# Patient Record
Sex: FEMALE | Race: WHITE | NOT HISPANIC OR LATINO | Employment: OTHER | URBAN - METROPOLITAN AREA
[De-identification: names, ages, dates, MRNs, and addresses within clinical notes are randomized per-mention and may not be internally consistent; named-entity substitution may affect disease eponyms.]

---

## 2017-01-12 RX ORDER — LEVOTHYROXINE SODIUM 0.05 MG/1
TABLET ORAL
Qty: 30 TABLET | Refills: 6 | Status: SHIPPED | OUTPATIENT
Start: 2017-01-12 | End: 2017-06-29 | Stop reason: SDUPTHER

## 2017-01-12 RX ORDER — CLONIDINE HYDROCHLORIDE 0.1 MG/1
0.1 TABLET ORAL NIGHTLY PRN
Qty: 30 TABLET | Refills: 1 | Status: SHIPPED | OUTPATIENT
Start: 2017-01-12 | End: 2018-08-07

## 2017-01-18 ENCOUNTER — TELEPHONE (OUTPATIENT)
Dept: INTERNAL MEDICINE | Facility: CLINIC | Age: 57
End: 2017-01-18

## 2017-01-18 NOTE — TELEPHONE ENCOUNTER
----- Message from Gen Sultana MD sent at 1/18/2017 10:03 AM EST -----  Labs are really good. No change for now, talk at next visit

## 2017-03-17 RX ORDER — AMLODIPINE BESYLATE 10 MG/1
TABLET ORAL
Qty: 90 TABLET | Refills: 0 | Status: SHIPPED | OUTPATIENT
Start: 2017-03-17 | End: 2017-06-19 | Stop reason: SDUPTHER

## 2017-03-17 RX ORDER — BENAZEPRIL HYDROCHLORIDE 20 MG/1
TABLET ORAL
Qty: 90 TABLET | Refills: 0 | Status: SHIPPED | OUTPATIENT
Start: 2017-03-17 | End: 2017-06-19 | Stop reason: SDUPTHER

## 2017-06-19 RX ORDER — BENAZEPRIL HYDROCHLORIDE 20 MG/1
TABLET ORAL
Qty: 90 TABLET | Refills: 0 | Status: SHIPPED | OUTPATIENT
Start: 2017-06-19 | End: 2017-09-20 | Stop reason: SDUPTHER

## 2017-06-19 RX ORDER — AMLODIPINE BESYLATE 10 MG/1
TABLET ORAL
Qty: 90 TABLET | Refills: 0 | Status: SHIPPED | OUTPATIENT
Start: 2017-06-19 | End: 2017-09-20 | Stop reason: SDUPTHER

## 2017-06-29 ENCOUNTER — OFFICE VISIT (OUTPATIENT)
Dept: INTERNAL MEDICINE | Facility: CLINIC | Age: 57
End: 2017-06-29

## 2017-06-29 VITALS
OXYGEN SATURATION: 98 % | WEIGHT: 167.2 LBS | HEART RATE: 88 BPM | BODY MASS INDEX: 29.62 KG/M2 | SYSTOLIC BLOOD PRESSURE: 132 MMHG | DIASTOLIC BLOOD PRESSURE: 78 MMHG | HEIGHT: 63 IN | RESPIRATION RATE: 16 BRPM

## 2017-06-29 DIAGNOSIS — E07.9 DISEASE OF THYROID GLAND: ICD-10-CM

## 2017-06-29 DIAGNOSIS — I10 BENIGN ESSENTIAL HTN: Primary | ICD-10-CM

## 2017-06-29 DIAGNOSIS — E78.2 MIXED HYPERLIPIDEMIA: ICD-10-CM

## 2017-06-29 LAB
T4 FREE SERPL-MCNC: 1.03 NG/DL (ref 0.93–1.7)
TSH SERPL DL<=0.005 MIU/L-ACNC: 3.35 MIU/ML (ref 0.27–4.2)

## 2017-06-29 PROCEDURE — 99214 OFFICE O/P EST MOD 30 MIN: CPT | Performed by: INTERNAL MEDICINE

## 2017-06-29 RX ORDER — LEVOTHYROXINE SODIUM 0.05 MG/1
50 TABLET ORAL DAILY
Qty: 90 TABLET | Refills: 3 | Status: SHIPPED | OUTPATIENT
Start: 2017-06-29 | End: 2018-07-12 | Stop reason: SDUPTHER

## 2017-06-29 NOTE — PROGRESS NOTES
"Subjective   Becca Reilly is a 56 y.o. female.     History of Present Illness   55 yo female with HTN, hypothyroidism, HL.  She is reporting some afternoon tiredness.  Usually around 3 she gets really very tired. Last few weeks her hot flashes have picked up. [place]   The following portions of the patient's history were reviewed and updated as appropriate: allergies, current medications, past family history, past medical history, past social history, past surgical history and problem list.    Review of Systems   Constitutional: Negative.    HENT: Negative.    Respiratory: Negative.    Genitourinary: Negative.    Musculoskeletal: Negative.    Hematological: Negative.    Psychiatric/Behavioral: Negative.    All other systems reviewed and are negative.      Objective   Physical Exam   /78  Pulse 88  Resp 16  Ht 63\" (160 cm)  Wt 167 lb 3.2 oz (75.8 kg)  SpO2 98%  BMI 29.62 kg/m2    General Appearance:  Alert, cooperative, no distress, appears stated age   Head:  Normocephalic, without obvious abnormality, atraumatic   Eyes:  PERRL, conjunctiva/corneas clear, EOM's intact   Ears:  Normal TM's and external ear canals, both ears   Nose: Nares normal, septum midline,mucosa normal, no drainage or sinus tenderness   Throat: Lips, mucosa, and tongue normal; teeth and gums normal   Neck: Supple, symmetrical, trachea midline, no adenopathy;  thyroid: not enlarged, symmetric, no tenderness/mass/nodules; no carotid bruit or JVD   Back:   Symmetric, no curvature, ROM normal, no CVA tenderness   Lungs:   Clear to auscultation bilaterally, respirations unlabored   Breasts:  defer   Heart:  Regular rate and rhythm, S1 and S2 normal, no murmur, rub, or gallop   Abdomen:   Soft, non-tender, bowel sounds active all four quadrants,  no masses, no organomegaly   Pelvic: Deferred   Extremities: Extremities normal, atraumatic, no cyanosis or edema   Pulses: 2+ and symmetric "   Skin: Skin color, texture, turgor normal, no rashes or lesions   Lymph nodes: Cervical, supraclavicular, and axillary nodes normal   Neurologic: Normal       12/2016 - TSH and lipid reviewed, High total 91.    Assessment/Plan   Becca was seen today for hot flashes and hypothyroidism.    Diagnoses and all orders for this visit:    Benign essential HTN    Mixed hyperlipidemia    Disease of thyroid gland  -     TSH  -     T4, free    refill thyroid meds with 90 day supply    HTN- well controlled  Check BP when tired, record  Goal around 120/70  Start exercising daily to get your stamina up  Goal 45 min per day    Hot flashing - strongly recommend spin class  Continue clonidine    Continue aspirin.     HEIDI risk calculation done 6.5%.     Discussed.  Spent 20 min in care of patient.

## 2017-06-30 ENCOUNTER — TELEPHONE (OUTPATIENT)
Dept: INTERNAL MEDICINE | Facility: CLINIC | Age: 57
End: 2017-06-30

## 2017-06-30 NOTE — TELEPHONE ENCOUNTER
----- Message from Gen Sultana MD sent at 6/29/2017  4:57 PM EDT -----  Labs good, sent 90 day refill for her on thyroid as we discussed

## 2017-09-20 RX ORDER — AMLODIPINE BESYLATE 10 MG/1
TABLET ORAL
Qty: 90 TABLET | Refills: 0 | Status: SHIPPED | OUTPATIENT
Start: 2017-09-20 | End: 2017-12-30 | Stop reason: SDUPTHER

## 2017-09-20 RX ORDER — BENAZEPRIL HYDROCHLORIDE 20 MG/1
TABLET ORAL
Qty: 90 TABLET | Refills: 0 | Status: SHIPPED | OUTPATIENT
Start: 2017-09-20 | End: 2017-12-30 | Stop reason: SDUPTHER

## 2017-11-14 ENCOUNTER — TELEPHONE (OUTPATIENT)
Dept: INTERNAL MEDICINE | Facility: CLINIC | Age: 57
End: 2017-11-14

## 2017-11-14 NOTE — TELEPHONE ENCOUNTER
----- Message from Bibi Uriarte sent at 11/14/2017  2:28 PM EST -----  Regarding: FW: PHYSICAL APPT & MEDS  Patient was contacted and decision was made based on information below that we would cancel lab and physical appointment and watch for any cancellations before end of calendar year.  ----- Message -----     From: Yuly Taylor MA     Sent: 11/14/2017   1:04 PM       To: Bibi Uriarte  Subject: RE: PHYSICAL APPT & MEDS                         We will refill her meds no problem.  ----- Message -----     From: Bibi Uriarte     Sent: 11/14/2017  12:50 PM       To: Yuly Taylor MA  Subject: PHYSICAL APPT & MEDS                             ivan    [phone] - pt #    Patient originally scheduled for labs 12/26 and physical 12/29.  Physical rescheduled to 2018 due to provider being out.  Patient's insurance ([place]) being cancelled and not rewritten for residents in [facility].  She does need her meds though.    Would Dr. Sultana want to write scripts for patient or have patient come in for a regular med check appt until insurance is figured out.

## 2017-12-11 DIAGNOSIS — E78.2 MIXED HYPERLIPIDEMIA: ICD-10-CM

## 2017-12-11 DIAGNOSIS — I10 BENIGN ESSENTIAL HTN: Primary | ICD-10-CM

## 2017-12-11 DIAGNOSIS — E07.9 DISEASE OF THYROID GLAND: ICD-10-CM

## 2017-12-11 DIAGNOSIS — M18.11 OSTEOARTHRITIS OF CARPOMETACARPAL JOINT OF RIGHT THUMB, UNSPECIFIED OSTEOARTHRITIS TYPE: ICD-10-CM

## 2017-12-12 ENCOUNTER — LAB (OUTPATIENT)
Dept: INTERNAL MEDICINE | Facility: CLINIC | Age: 57
End: 2017-12-12

## 2017-12-12 DIAGNOSIS — E78.2 MIXED HYPERLIPIDEMIA: ICD-10-CM

## 2017-12-12 DIAGNOSIS — M18.11 OSTEOARTHRITIS OF CARPOMETACARPAL JOINT OF RIGHT THUMB, UNSPECIFIED OSTEOARTHRITIS TYPE: ICD-10-CM

## 2017-12-12 DIAGNOSIS — I10 BENIGN ESSENTIAL HTN: ICD-10-CM

## 2017-12-12 DIAGNOSIS — E07.9 DISEASE OF THYROID GLAND: ICD-10-CM

## 2017-12-12 LAB
25(OH)D3+25(OH)D2 SERPL-MCNC: 53.3 NG/ML
ALBUMIN SERPL-MCNC: 4.4 G/DL (ref 3.5–5.2)
ALBUMIN/GLOB SERPL: 1.7 G/DL
ALP SERPL-CCNC: 71 U/L (ref 40–129)
ALT SERPL-CCNC: 16 U/L (ref 5–33)
AST SERPL-CCNC: 19 U/L (ref 5–32)
BASOPHILS # BLD AUTO: 0.03 10*3/MM3 (ref 0–0.2)
BASOPHILS NFR BLD AUTO: 0.3 % (ref 0–2)
BILIRUB SERPL-MCNC: 0.4 MG/DL (ref 0.2–1.2)
BUN SERPL-MCNC: 17 MG/DL (ref 6–20)
BUN/CREAT SERPL: 25 (ref 7–25)
CALCIUM SERPL-MCNC: 9.6 MG/DL (ref 8.6–10.5)
CHLORIDE SERPL-SCNC: 96 MMOL/L (ref 98–107)
CHOLEST SERPL-MCNC: 267 MG/DL (ref 0–200)
CO2 SERPL-SCNC: 28.8 MMOL/L (ref 22–29)
CREAT SERPL-MCNC: 0.68 MG/DL (ref 0.57–1)
EOSINOPHIL # BLD AUTO: 0.21 10*3/MM3 (ref 0.1–0.3)
EOSINOPHIL NFR BLD AUTO: 2 % (ref 0–4)
ERYTHROCYTE [DISTWIDTH] IN BLOOD BY AUTOMATED COUNT: 13.6 % (ref 11.5–14.5)
GFR SERPLBLD CREATININE-BSD FMLA CKD-EPI: 108 ML/MIN/1.73
GFR SERPLBLD CREATININE-BSD FMLA CKD-EPI: 89 ML/MIN/1.73
GLOBULIN SER CALC-MCNC: 2.6 GM/DL
GLUCOSE SERPL-MCNC: 90 MG/DL (ref 65–99)
HBA1C MFR BLD: 5.7 % (ref 4.8–5.6)
HCT VFR BLD AUTO: 41.9 % (ref 37–47)
HDLC SERPL-MCNC: 105 MG/DL (ref 40–60)
HGB BLD-MCNC: 13.9 G/DL (ref 12–16)
IMM GRANULOCYTES # BLD: 0.03 10*3/MM3 (ref 0–0.03)
IMM GRANULOCYTES NFR BLD: 0.3 % (ref 0–0.5)
LDLC SERPL CALC-MCNC: 143 MG/DL (ref 0–100)
LDLC/HDLC SERPL: 1.36 {RATIO}
LYMPHOCYTES # BLD AUTO: 1.19 10*3/MM3 (ref 0.6–4.8)
LYMPHOCYTES NFR BLD AUTO: 11.5 % (ref 20–45)
MCH RBC QN AUTO: 30.4 PG (ref 27–31)
MCHC RBC AUTO-ENTMCNC: 33.2 G/DL (ref 31–37)
MCV RBC AUTO: 91.7 FL (ref 81–99)
MONOCYTES # BLD AUTO: 0.47 10*3/MM3 (ref 0–1)
MONOCYTES NFR BLD AUTO: 4.5 % (ref 3–8)
NEUTROPHILS # BLD AUTO: 8.46 10*3/MM3 (ref 1.5–8.3)
NEUTROPHILS NFR BLD AUTO: 81.4 % (ref 45–70)
NRBC BLD AUTO-RTO: 0 /100 WBC (ref 0–0)
PLATELET # BLD AUTO: 357 10*3/MM3 (ref 140–500)
POTASSIUM SERPL-SCNC: 4.8 MMOL/L (ref 3.5–5.2)
PROT SERPL-MCNC: 7 G/DL (ref 6–8.5)
RBC # BLD AUTO: 4.57 10*6/MM3 (ref 4.2–5.4)
SODIUM SERPL-SCNC: 138 MMOL/L (ref 136–145)
TRIGL SERPL-MCNC: 94 MG/DL (ref 0–150)
TSH SERPL DL<=0.005 MIU/L-ACNC: 2.55 MIU/ML (ref 0.27–4.2)
VLDLC SERPL CALC-MCNC: 18.8 MG/DL (ref 7–27)
WBC # BLD AUTO: 10.39 10*3/MM3 (ref 4.8–10.8)

## 2017-12-19 ENCOUNTER — OFFICE VISIT (OUTPATIENT)
Dept: INTERNAL MEDICINE | Facility: CLINIC | Age: 57
End: 2017-12-19

## 2017-12-19 VITALS
DIASTOLIC BLOOD PRESSURE: 82 MMHG | HEIGHT: 63 IN | SYSTOLIC BLOOD PRESSURE: 122 MMHG | WEIGHT: 172 LBS | RESPIRATION RATE: 16 BRPM | OXYGEN SATURATION: 98 % | HEART RATE: 86 BPM | BODY MASS INDEX: 30.48 KG/M2

## 2017-12-19 DIAGNOSIS — I10 BENIGN ESSENTIAL HTN: Primary | ICD-10-CM

## 2017-12-19 DIAGNOSIS — R73.9 HYPERGLYCEMIA: ICD-10-CM

## 2017-12-19 DIAGNOSIS — E07.9 DISEASE OF THYROID GLAND: ICD-10-CM

## 2017-12-19 PROCEDURE — 99214 OFFICE O/P EST MOD 30 MIN: CPT | Performed by: INTERNAL MEDICINE

## 2017-12-19 NOTE — PATIENT INSTRUCTIONS
nRBC 0.0 0.0 - 0.0 /100 WBC         Assessment/Plan   Becca was seen today for hypertension.     Diagnoses and all orders for this visit:     Benign essential HTN     Disease of thyroid gland     Hyperglycemia     Continue daily exercises  Start PT through [email or web address] for knee and quad strengthening     Hyperglycemia - prediabetes  Lower bread, rice and noodle intake  Avoid sweets.      No Follow-up on file.     Gen Sultana MD  12/19/2017

## 2017-12-19 NOTE — PROGRESS NOTES
Subjective     Becca Reilly is a 57 y.o. female, who presents with a chief complaint of   Chief Complaint   Patient presents with   • Hypertension       HPI   56 yo female with PMHx Hypothyroidism and HTN.  BP stable here and at home. NO cp. Her thyroid is stable here for lab review.     Also here for a new pain -   Pain in R leg after twisting her R leg,  During a pickle ball game.  She does have pain in the morning first thing when walking, but is tolerating playing pickle ball. She has one toe in her R foot - really intertriginal which is tingling.  Has appt with Trey Koenig later in the week. She is having trouble in the knee itself with stairs.        The following portions of the patient's history were reviewed and updated as appropriate: allergies, current medications, past family history, past medical history, past social history, past surgical history and problem list.    Allergies: Review of patient's allergies indicates no known allergies.    Current Outpatient Prescriptions:   •  amLODIPine (NORVASC) 10 MG tablet, TAKE ONE TABLET BY MOUTH ONE TIME DAILY , Disp: 90 tablet, Rfl: 0  •  aspirin 81 MG tablet, Take  by mouth., Disp: , Rfl:   •  benazepril (LOTENSIN) 20 MG tablet, TAKE ONE TABLET BY MOUTH ONE TIME DAILY , Disp: 90 tablet, Rfl: 0  •  CloNIDine (CATAPRES) 0.1 MG tablet, Take 1 tablet by mouth At Night As Needed for high blood pressure (hot flashes)., Disp: 30 tablet, Rfl: 1  •  levothyroxine (SYNTHROID, LEVOTHROID) 50 MCG tablet, Take 1 tablet by mouth Daily., Disp: 90 tablet, Rfl: 3  There are no discontinued medications.    Review of Systems   Constitutional: Negative.  Negative for fatigue, fever and unexpected weight change.   HENT: Negative for sinus pressure and trouble swallowing.    Respiratory: Negative.  Negative for cough.    Cardiovascular: Negative for chest pain, palpitations and leg swelling.   Gastrointestinal: Negative for constipation and diarrhea.   Endocrine:        Stable  "hypothyroidism   Genitourinary: Negative.  Negative for dysuria, frequency, hematuria, pelvic pain and vaginal discharge.   Musculoskeletal: Negative.    Skin: Negative.    Neurological: Negative for dizziness, light-headedness and headaches.   Hematological: Negative.    Psychiatric/Behavioral: Negative.        Objective     /82  Pulse 86  Resp 16  Ht 160 cm (63\")  Wt 78 kg (172 lb)  SpO2 98%  BMI 30.47 kg/m2      Physical Exam   Constitutional: She is oriented to person, place, and time. She appears well-developed and well-nourished.   HENT:   Head: Normocephalic and atraumatic.   Right Ear: External ear normal.   Left Ear: External ear normal.   Mouth/Throat: No oropharyngeal exudate.   Eyes: EOM are normal. Pupils are equal, round, and reactive to light. Right eye exhibits no discharge. Left eye exhibits no discharge.   Neck: Neck supple. No thyromegaly present.   Cardiovascular: Normal rate and regular rhythm.    No murmur heard.  Pulmonary/Chest: Effort normal. No respiratory distress.   Abdominal: Soft. She exhibits no distension. There is no tenderness.   Musculoskeletal: Normal range of motion.   ++crepitus, bilateral symmetric joint effusion   Neurological: She is alert and oriented to person, place, and time. No cranial nerve deficit.   Skin: Skin is warm.   Psychiatric: She has a normal mood and affect. Her behavior is normal.   Nursing note and vitals reviewed.      Lab Results (most recent)     None          Results for orders placed or performed in visit on 12/12/17   Comprehensive metabolic panel   Result Value Ref Range    Glucose 90 65 - 99 mg/dL    BUN 17 6 - 20 mg/dL    Creatinine 0.68 0.57 - 1.00 mg/dL    eGFR Non African Am 89 >60 mL/min/1.73    eGFR African Am 108 >60 mL/min/1.73    BUN/Creatinine Ratio 25.0 7.0 - 25.0    Sodium 138 136 - 145 mmol/L    Potassium 4.8 3.5 - 5.2 mmol/L    Chloride 96 (L) 98 - 107 mmol/L    Total CO2 28.8 22.0 - 29.0 mmol/L    Calcium 9.6 8.6 - 10.5 " mg/dL    Total Protein 7.0 6.0 - 8.5 g/dL    Albumin 4.40 3.50 - 5.20 g/dL    Globulin 2.6 gm/dL    A/G Ratio 1.7 g/dL    Total Bilirubin 0.4 0.2 - 1.2 mg/dL    Alkaline Phosphatase 71 40 - 129 U/L    AST (SGOT) 19 5 - 32 U/L    ALT (SGPT) 16 5 - 33 U/L   Lipid Panel With LDL/HDL Ratio   Result Value Ref Range    Total Cholesterol 267 (H) 0 - 200 mg/dL    Triglycerides 94 0 - 150 mg/dL    HDL Cholesterol 105 (H) 40 - 60 mg/dL    VLDL Cholesterol 18.8 7 - 27 mg/dL    LDL Cholesterol  143 (H) 0 - 100 mg/dL    LDL/HDL Ratio 1.36    Hemoglobin A1c   Result Value Ref Range    Hemoglobin A1C 5.70 (H) 4.80 - 5.60 %   TSH   Result Value Ref Range    TSH 2.550 0.270 - 4.200 mIU/mL   Vitamin D 25 hydroxy   Result Value Ref Range    25 Hydroxy, Vitamin D 53.3 ng/mL   CBC w AUTO Differential   Result Value Ref Range    WBC 10.39 4.80 - 10.80 10*3/mm3    RBC 4.57 4.20 - 5.40 10*6/mm3    Hemoglobin 13.9 12.0 - 16.0 g/dL    Hematocrit 41.9 37.0 - 47.0 %    MCV 91.7 81.0 - 99.0 fL    MCH 30.4 27.0 - 31.0 pg    MCHC 33.2 31.0 - 37.0 g/dL    RDW 13.6 11.5 - 14.5 %    Platelets 357 140 - 500 10*3/mm3    Neutrophil Rel % 81.4 (H) 45.0 - 70.0 %    Lymphocyte Rel % 11.5 (L) 20.0 - 45.0 %    Monocyte Rel % 4.5 3.0 - 8.0 %    Eosinophil Rel % 2.0 0.0 - 4.0 %    Basophil Rel % 0.3 0.0 - 2.0 %    Neutrophils Absolute 8.46 (H) 1.50 - 8.30 10*3/mm3    Lymphocytes Absolute 1.19 0.60 - 4.80 10*3/mm3    Monocytes Absolute 0.47 0.00 - 1.00 10*3/mm3    Eosinophils Absolute 0.21 0.10 - 0.30 10*3/mm3    Basophils Absolute 0.03 0.00 - 0.20 10*3/mm3    Immature Granulocyte Rel % 0.3 0.0 - 0.5 %    Immature Grans Absolute 0.03 0.00 - 0.03 10*3/mm3    nRBC 0.0 0.0 - 0.0 /100 WBC       Assessment/Plan   Becca was seen today for hypertension.    Diagnoses and all orders for this visit:    Benign essential HTN    Disease of thyroid gland    Hyperglycemia    Continue daily exercises  Start PT through [email or web address] for knee and quad strengthening    Hyperglycemia  - prediabetes  Lower bread, rice and noodle intake  Avoid sweets.     Return in about 6 months (around 6/19/2018) for Annual physical.    Gen Sultana MD  12/19/2017

## 2018-01-02 RX ORDER — BENAZEPRIL HYDROCHLORIDE 20 MG/1
TABLET ORAL
Qty: 90 TABLET | Refills: 2 | Status: SHIPPED | OUTPATIENT
Start: 2018-01-02 | End: 2018-09-25 | Stop reason: SDUPTHER

## 2018-01-02 RX ORDER — AMLODIPINE BESYLATE 10 MG/1
TABLET ORAL
Qty: 90 TABLET | Refills: 2 | Status: SHIPPED | OUTPATIENT
Start: 2018-01-02 | End: 2018-09-25 | Stop reason: SDUPTHER

## 2018-04-27 DIAGNOSIS — E07.9 DISEASE OF THYROID GLAND: ICD-10-CM

## 2018-04-27 DIAGNOSIS — E78.2 MIXED HYPERLIPIDEMIA: Primary | ICD-10-CM

## 2018-04-27 DIAGNOSIS — R73.9 HYPERGLYCEMIA: ICD-10-CM

## 2018-04-30 LAB
ALBUMIN SERPL-MCNC: 4.3 G/DL (ref 3.5–5.2)
ALBUMIN/GLOB SERPL: 1.4 G/DL
ALP SERPL-CCNC: 72 U/L (ref 40–129)
ALT SERPL-CCNC: 14 U/L (ref 5–33)
AST SERPL-CCNC: 17 U/L (ref 5–32)
BASOPHILS # BLD AUTO: 0.05 10*3/MM3 (ref 0–0.2)
BASOPHILS NFR BLD AUTO: 0.9 % (ref 0–2)
BILIRUB SERPL-MCNC: 0.4 MG/DL (ref 0.2–1.2)
BUN SERPL-MCNC: 17 MG/DL (ref 6–20)
BUN/CREAT SERPL: 24.6 (ref 7–25)
CALCIUM SERPL-MCNC: 9.9 MG/DL (ref 8.6–10.5)
CHLORIDE SERPL-SCNC: 100 MMOL/L (ref 98–107)
CHOLEST SERPL-MCNC: 257 MG/DL (ref 0–200)
CO2 SERPL-SCNC: 29.1 MMOL/L (ref 22–29)
CREAT SERPL-MCNC: 0.69 MG/DL (ref 0.57–1)
EOSINOPHIL # BLD AUTO: 0.38 10*3/MM3 (ref 0.1–0.3)
EOSINOPHIL NFR BLD AUTO: 6.9 % (ref 0–4)
ERYTHROCYTE [DISTWIDTH] IN BLOOD BY AUTOMATED COUNT: 13.4 % (ref 11.5–14.5)
GFR SERPLBLD CREATININE-BSD FMLA CKD-EPI: 106 ML/MIN/1.73
GFR SERPLBLD CREATININE-BSD FMLA CKD-EPI: 87 ML/MIN/1.73
GLOBULIN SER CALC-MCNC: 3 GM/DL
GLUCOSE SERPL-MCNC: 99 MG/DL (ref 65–99)
HCT VFR BLD AUTO: 42.2 % (ref 37–47)
HDLC SERPL-MCNC: 103 MG/DL (ref 40–60)
HGB BLD-MCNC: 13.9 G/DL (ref 12–16)
IMM GRANULOCYTES # BLD: 0.01 10*3/MM3 (ref 0–0.03)
IMM GRANULOCYTES NFR BLD: 0.2 % (ref 0–0.5)
LDLC SERPL CALC-MCNC: 135 MG/DL (ref 0–100)
LDLC/HDLC SERPL: 1.31 {RATIO}
LYMPHOCYTES # BLD AUTO: 2.58 10*3/MM3 (ref 0.6–4.8)
LYMPHOCYTES NFR BLD AUTO: 46.8 % (ref 20–45)
MCH RBC QN AUTO: 30.2 PG (ref 27–31)
MCHC RBC AUTO-ENTMCNC: 32.9 G/DL (ref 31–37)
MCV RBC AUTO: 91.7 FL (ref 81–99)
MONOCYTES # BLD AUTO: 0.47 10*3/MM3 (ref 0–1)
MONOCYTES NFR BLD AUTO: 8.5 % (ref 3–8)
NEUTROPHILS # BLD AUTO: 2.02 10*3/MM3 (ref 1.5–8.3)
NEUTROPHILS NFR BLD AUTO: 36.7 % (ref 45–70)
NRBC BLD AUTO-RTO: 0 /100 WBC (ref 0–0)
PLATELET # BLD AUTO: 387 10*3/MM3 (ref 140–500)
POTASSIUM SERPL-SCNC: 4.5 MMOL/L (ref 3.5–5.2)
PROT SERPL-MCNC: 7.3 G/DL (ref 6–8.5)
RBC # BLD AUTO: 4.6 10*6/MM3 (ref 4.2–5.4)
SODIUM SERPL-SCNC: 140 MMOL/L (ref 136–145)
TRIGL SERPL-MCNC: 94 MG/DL (ref 0–150)
TSH SERPL DL<=0.005 MIU/L-ACNC: 3.1 MIU/ML (ref 0.27–4.2)
VLDLC SERPL CALC-MCNC: 18.8 MG/DL (ref 7–27)
WBC # BLD AUTO: 5.51 10*3/MM3 (ref 4.8–10.8)

## 2018-05-02 ENCOUNTER — RESULTS ENCOUNTER (OUTPATIENT)
Dept: INTERNAL MEDICINE | Facility: CLINIC | Age: 58
End: 2018-05-02

## 2018-05-02 DIAGNOSIS — E07.9 DISEASE OF THYROID GLAND: ICD-10-CM

## 2018-05-02 DIAGNOSIS — R73.9 HYPERGLYCEMIA: ICD-10-CM

## 2018-05-02 DIAGNOSIS — E78.2 MIXED HYPERLIPIDEMIA: ICD-10-CM

## 2018-05-03 ENCOUNTER — OFFICE VISIT (OUTPATIENT)
Dept: INTERNAL MEDICINE | Facility: CLINIC | Age: 58
End: 2018-05-03

## 2018-05-03 VITALS
SYSTOLIC BLOOD PRESSURE: 110 MMHG | HEIGHT: 63 IN | DIASTOLIC BLOOD PRESSURE: 60 MMHG | BODY MASS INDEX: 29.95 KG/M2 | WEIGHT: 169 LBS | HEART RATE: 70 BPM | OXYGEN SATURATION: 98 %

## 2018-05-03 DIAGNOSIS — M25.50 ARTHRALGIA OF MULTIPLE JOINTS: Primary | ICD-10-CM

## 2018-05-03 PROCEDURE — 99214 OFFICE O/P EST MOD 30 MIN: CPT | Performed by: INTERNAL MEDICINE

## 2018-05-03 RX ORDER — DULOXETIN HYDROCHLORIDE 30 MG/1
30 CAPSULE, DELAYED RELEASE ORAL DAILY
Qty: 30 CAPSULE | Refills: 2 | Status: SHIPPED | OUTPATIENT
Start: 2018-05-03 | End: 2018-07-27 | Stop reason: SDUPTHER

## 2018-05-03 NOTE — PROGRESS NOTES
"      Becca Reilly is a 57 y.o. female, who presents with a chief complaint of   Chief Complaint   Patient presents with   • Generalized Body Aches     Has been going on for a while, but has gotten worse in the last four months. Her whole entire body just aches.        HPI   58 yo female with recent worsening of generalized myalgia that spares the joints.   The following portions of the patient's history were reviewed and updated as appropriate: allergies, current medications, past family history, past medical history, past social history, past surgical history and problem list.    Allergies: Review of patient's allergies indicates no known allergies.    Current Outpatient Prescriptions:   •  amLODIPine (NORVASC) 10 MG tablet, TAKE ONE TABLET BY MOUTH ONE TIME DAILY , Disp: 90 tablet, Rfl: 2  •  aspirin 81 MG tablet, Take  by mouth., Disp: , Rfl:   •  benazepril (LOTENSIN) 20 MG tablet, TAKE ONE TABLET BY MOUTH ONE TIME DAILY , Disp: 90 tablet, Rfl: 2  •  CloNIDine (CATAPRES) 0.1 MG tablet, Take 1 tablet by mouth At Night As Needed for high blood pressure (hot flashes)., Disp: 30 tablet, Rfl: 1  •  levothyroxine (SYNTHROID, LEVOTHROID) 50 MCG tablet, Take 1 tablet by mouth Daily., Disp: 90 tablet, Rfl: 3  •  DULoxetine (CYMBALTA) 30 MG capsule, Take 1 capsule by mouth Daily., Disp: 30 capsule, Rfl: 2  There are no discontinued medications.    Review of Systems          /60 (BP Location: Left arm, Patient Position: Sitting, Cuff Size: Adult)   Pulse 70   Ht 160 cm (62.99\")   Wt 76.7 kg (169 lb)   SpO2 98%   BMI 29.94 kg/m²       Physical Exam    Lab Results (most recent)     None          Results for orders placed or performed in visit on 05/02/18   Comprehensive metabolic panel   Result Value Ref Range    Glucose 99 65 - 99 mg/dL    BUN 17 6 - 20 mg/dL    Creatinine 0.69 0.57 - 1.00 mg/dL    eGFR Non African Am 87 >60 mL/min/1.73    eGFR African Am 106 >60 mL/min/1.73    BUN/Creatinine Ratio 24.6 7.0 " - 25.0    Sodium 140 136 - 145 mmol/L    Potassium 4.5 3.5 - 5.2 mmol/L    Chloride 100 98 - 107 mmol/L    Total CO2 29.1 (H) 22.0 - 29.0 mmol/L    Calcium 9.9 8.6 - 10.5 mg/dL    Total Protein 7.3 6.0 - 8.5 g/dL    Albumin 4.30 3.50 - 5.20 g/dL    Globulin 3.0 gm/dL    A/G Ratio 1.4 g/dL    Total Bilirubin 0.4 0.2 - 1.2 mg/dL    Alkaline Phosphatase 72 40 - 129 U/L    AST (SGOT) 17 5 - 32 U/L    ALT (SGPT) 14 5 - 33 U/L   Lipid Panel With LDL/HDL Ratio   Result Value Ref Range    Total Cholesterol 257 (H) 0 - 200 mg/dL    Triglycerides 94 0 - 150 mg/dL    HDL Cholesterol 103 (H) 40 - 60 mg/dL    VLDL Cholesterol 18.8 7 - 27 mg/dL    LDL Cholesterol  135 (H) 0 - 100 mg/dL    LDL/HDL Ratio 1.31    TSH   Result Value Ref Range    TSH 3.100 0.270 - 4.200 mIU/mL   CBC w AUTO Differential   Result Value Ref Range    WBC 5.51 4.80 - 10.80 10*3/mm3    RBC 4.60 4.20 - 5.40 10*6/mm3    Hemoglobin 13.9 12.0 - 16.0 g/dL    Hematocrit 42.2 37.0 - 47.0 %    MCV 91.7 81.0 - 99.0 fL    MCH 30.2 27.0 - 31.0 pg    MCHC 32.9 31.0 - 37.0 g/dL    RDW 13.4 11.5 - 14.5 %    Platelets 387 140 - 500 10*3/mm3    Neutrophil Rel % 36.7 (L) 45.0 - 70.0 %    Lymphocyte Rel % 46.8 (H) 20.0 - 45.0 %    Monocyte Rel % 8.5 (H) 3.0 - 8.0 %    Eosinophil Rel % 6.9 (H) 0.0 - 4.0 %    Basophil Rel % 0.9 0.0 - 2.0 %    Neutrophils Absolute 2.02 1.50 - 8.30 10*3/mm3    Lymphocytes Absolute 2.58 0.60 - 4.80 10*3/mm3    Monocytes Absolute 0.47 0.00 - 1.00 10*3/mm3    Eosinophils Absolute 0.38 (H) 0.10 - 0.30 10*3/mm3    Basophils Absolute 0.05 0.00 - 0.20 10*3/mm3    Immature Granulocyte Rel % 0.2 0.0 - 0.5 %    Immature Grans Absolute 0.01 0.00 - 0.03 10*3/mm3    nRBC 0.0 0.0 - 0.0 /100 WBC           Becca was seen today for generalized body aches.    Diagnoses and all orders for this visit:    Arthralgia of multiple joints  -     DULoxetine (CYMBALTA) 30 MG capsule; Take 1 capsule by mouth Daily.    suspect fibromyalgia  cymbalta 30 mg po daily  Add DAYA  and CK to labs, add T3 and T4      Add warm water swim vs low impact strenthening class three times per week  [facility], [facility], [facility] is an option  [facility] has warm water swim.       Return in about 3 months (around 8/3/2018).    Gen Sultana MD  05/03/2018

## 2018-05-03 NOTE — PATIENT INSTRUCTIONS
Duloxetine delayed-release capsules  What is this medicine?  DULOXETINE (doo LOX e teen) is used to treat depression, anxiety, and different types of chronic pain.  This medicine may be used for other purposes; ask your health care provider or pharmacist if you have questions.  COMMON BRAND NAME(S): Radha Murillo  What should I tell my health care provider before I take this medicine?  They need to know if you have any of these conditions:  -bipolar disorder or a family history of bipolar disorder  -glaucoma  -kidney disease  -liver disease  -suicidal thoughts or a previous suicide attempt  -taken medicines called MAOIs like Carbex, Eldepryl, Marplan, Nardil, and Parnate within 14 days  -an unusual reaction to duloxetine, other medicines, foods, dyes, or preservatives  -pregnant or trying to get pregnant  -breast-feeding  How should I use this medicine?  Take this medicine by mouth with a glass of water. Follow the directions on the prescription label. Do not cut, crush or chew this medicine. You can take this medicine with or without food. Take your medicine at regular intervals. Do not take your medicine more often than directed. Do not stop taking this medicine suddenly except upon the advice of your doctor. Stopping this medicine too quickly may cause serious side effects or your condition may worsen.  A special MedGuide will be given to you by the pharmacist with each prescription and refill. Be sure to read this information carefully each time.  Talk to your pediatrician regarding the use of this medicine in children. While this drug may be prescribed for children as young as 7 years of age for selected conditions, precautions do apply.  Overdosage: If you think you have taken too much of this medicine contact a poison control center or emergency room at once.  NOTE: This medicine is only for you. Do not share this medicine with others.  What if I miss a dose?  If you miss a dose, take it as soon as you  can. If it is almost time for your next dose, take only that dose. Do not take double or extra doses.  What may interact with this medicine?  Do not take this medicine with any of the following medications:  -desvenlafaxine  -levomilnacipran  -linezolid  -MAOIs like Carbex, Eldepryl, Marplan, Nardil, and Parnate  -methylene blue (injected into a vein)  -milnacipran  -thioridazine  -venlafaxine  This medicine may also interact with the following medications:  -alcohol  -amphetamines  -aspirin and aspirin-like medicines  -certain antibiotics like ciprofloxacin and enoxacin  -certain medicines for blood pressure, heart disease, irregular heart beat  -certain medicines for depression, anxiety, or psychotic disturbances  -certain medicines for migraine headache like almotriptan, eletriptan, frovatriptan, naratriptan, rizatriptan, sumatriptan, zolmitriptan  -certain medicines that treat or prevent blood clots like warfarin, enoxaparin, and dalteparin  -cimetidine  -fentanyl  -lithium  -NSAIDS, medicines for pain and inflammation, like ibuprofen or naproxen  -phentermine  -procarbazine  -rasagiline  -sibutramine  -Willis's wort  -theophylline  -tramadol  -tryptophan  This list may not describe all possible interactions. Give your health care provider a list of all the medicines, herbs, non-prescription drugs, or dietary supplements you use. Also tell them if you smoke, drink alcohol, or use illegal drugs. Some items may interact with your medicine.  What should I watch for while using this medicine?  Tell your doctor if your symptoms do not get better or if they get worse. Visit your doctor or health care professional for regular checks on your progress. Because it may take several weeks to see the full effects of this medicine, it is important to continue your treatment as prescribed by your doctor.  Patients and their families should watch out for new or worsening thoughts of suicide or depression. Also watch out for  sudden changes in feelings such as feeling anxious, agitated, panicky, irritable, hostile, aggressive, impulsive, severely restless, overly excited and hyperactive, or not being able to sleep. If this happens, especially at the beginning of treatment or after a change in dose, call your health care professional.  You may get drowsy or dizzy. Do not drive, use machinery, or do anything that needs mental alertness until you know how this medicine affects you. Do not stand or sit up quickly, especially if you are an older patient. This reduces the risk of dizzy or fainting spells. Alcohol may interfere with the effect of this medicine. Avoid alcoholic drinks.  This medicine can cause an increase in blood pressure. This medicine can also cause a sudden drop in your blood pressure, which may make you feel faint and increase the chance of a fall. These effects are most common when you first start the medicine or when the dose is increased, or during use of other medicines that can cause a sudden drop in blood pressure. Check with your doctor for instructions on monitoring your blood pressure while taking this medicine.  Your mouth may get dry. Chewing sugarless gum or sucking hard candy, and drinking plenty of water may help. Contact your doctor if the problem does not go away or is severe.  What side effects may I notice from receiving this medicine?  Side effects that you should report to your doctor or health care professional as soon as possible:  -allergic reactions like skin rash, itching or hives, swelling of the face, lips, or tongue  -anxious  -breathing problems  -confusion  -changes in vision  -chest pain  -confusion  -elevated mood, decreased need for sleep, racing thoughts, impulsive behavior  -eye pain  -fast, irregular heartbeat  -feeling faint or lightheaded, falls  -feeling agitated, angry, or irritable  -hallucination, loss of contact with reality  -high blood pressure  -loss of balance or  coordination  -palpitations  -redness, blistering, peeling or loosening of the skin, including inside the mouth  -restlessness, pacing, inability to keep still  -seizures  -stiff muscles  -suicidal thoughts or other mood changes  -trouble passing urine or change in the amount of urine  -trouble sleeping  -unusual bleeding or bruising  -unusually weak or tired  -vomiting  -yellowing of the eyes or skin  Side effects that usually do not require medical attention (report to your doctor or health care professional if they continue or are bothersome):  -change in sex drive or performance  -change in appetite or weight  -constipation  -dizziness  -dry mouth  -headache  -increased sweating  -nausea  -tired  This list may not describe all possible side effects. Call your doctor for medical advice about side effects. You may report side effects to FDA at 2-910-FDA-3413.  Where should I keep my medicine?  Keep out of the reach of children.  Store at room temperature between 20 and 25 degrees C (68 to 77 degrees F). Throw away any unused medicine after the expiration date.  NOTE: This sheet is a summary. It may not cover all possible information. If you have questions about this medicine, talk to your doctor, pharmacist, or health care provider.  © 2018 [employer] (2017-05-18 18:16:03)

## 2018-05-04 ENCOUNTER — TELEPHONE (OUTPATIENT)
Dept: INTERNAL MEDICINE | Facility: CLINIC | Age: 58
End: 2018-05-04

## 2018-05-04 LAB
ANA SER QL: NEGATIVE
CK SERPL-CCNC: 90 U/L (ref 26–142)
ERYTHROCYTE [SEDIMENTATION RATE] IN BLOOD BY WESTERGREN METHOD: 10 MM/HR (ref 0–30)
T3FREE SERPL-MCNC: 2.5 PG/ML (ref 2–4.4)
T4 FREE SERPL-MCNC: 1.14 NG/DL (ref 0.93–1.7)

## 2018-05-09 ENCOUNTER — TELEPHONE (OUTPATIENT)
Dept: INTERNAL MEDICINE | Facility: CLINIC | Age: 58
End: 2018-05-09

## 2018-05-09 NOTE — TELEPHONE ENCOUNTER
----- Message from Gen Sultana MD sent at 5/4/2018 10:17 AM EDT -----  All of the add on labs for autoimmunity and muscle disease were negative. Please try cymbalta and let me know how that goes.  Thanks.    Pt was given info and told the info was on [email or web address]

## 2018-07-13 RX ORDER — LEVOTHYROXINE SODIUM 0.05 MG/1
TABLET ORAL
Qty: 90 TABLET | Refills: 1 | Status: SHIPPED | OUTPATIENT
Start: 2018-07-13 | End: 2018-12-28 | Stop reason: SDUPTHER

## 2018-07-27 DIAGNOSIS — M25.50 ARTHRALGIA OF MULTIPLE JOINTS: ICD-10-CM

## 2018-07-27 RX ORDER — DULOXETIN HYDROCHLORIDE 30 MG/1
CAPSULE, DELAYED RELEASE ORAL
Qty: 90 CAPSULE | Refills: 1 | Status: SHIPPED | OUTPATIENT
Start: 2018-07-27 | End: 2019-03-19

## 2018-08-07 ENCOUNTER — OFFICE VISIT (OUTPATIENT)
Dept: INTERNAL MEDICINE | Facility: CLINIC | Age: 58
End: 2018-08-07

## 2018-08-07 VITALS
OXYGEN SATURATION: 98 % | DIASTOLIC BLOOD PRESSURE: 74 MMHG | HEART RATE: 77 BPM | RESPIRATION RATE: 16 BRPM | HEIGHT: 63 IN | SYSTOLIC BLOOD PRESSURE: 118 MMHG

## 2018-08-07 DIAGNOSIS — M25.50 ARTHRALGIA OF MULTIPLE JOINTS: ICD-10-CM

## 2018-08-07 DIAGNOSIS — I10 BENIGN ESSENTIAL HTN: Primary | ICD-10-CM

## 2018-08-07 PROCEDURE — 99214 OFFICE O/P EST MOD 30 MIN: CPT | Performed by: INTERNAL MEDICINE

## 2018-08-07 NOTE — PATIENT INSTRUCTIONS
Becca was seen today for hyperlipidemia and hypothyroidism.    Diagnoses and all orders for this visit:    Benign essential HTN    Arthralgia of multiple joints      Try to bike or do pilates daily  Goal is 6 days per week  Try to stay on cymbalta to help her pain while exercising regularly.      Return in about 6 months (around 2/7/2019).    Gen Sultana MD  08/07/2018

## 2018-08-07 NOTE — PROGRESS NOTES
"      Becca Reilly is a 57 y.o. female, who presents with a chief complaint of   Chief Complaint   Patient presents with   • Hyperlipidemia   • Hypothyroidism       HPI     56 yo female witih likely FM - started cymbalta last visit in May.  She has also started pilates    The following portions of the patient's history were reviewed and updated as appropriate: allergies, current medications, past family history, past medical history, past social history, past surgical history and problem list.    Allergies: Patient has no known allergies.    Current Outpatient Prescriptions:   •  amLODIPine (NORVASC) 10 MG tablet, TAKE ONE TABLET BY MOUTH ONE TIME DAILY , Disp: 90 tablet, Rfl: 2  •  aspirin 81 MG tablet, Take  by mouth., Disp: , Rfl:   •  benazepril (LOTENSIN) 20 MG tablet, TAKE ONE TABLET BY MOUTH ONE TIME DAILY , Disp: 90 tablet, Rfl: 2  •  DULoxetine (CYMBALTA) 30 MG capsule, TAKE ONE CAPSULE BY MOUTH ONE TIME DAILY , Disp: 90 capsule, Rfl: 1  •  levothyroxine (SYNTHROID, LEVOTHROID) 50 MCG tablet, TAKE ONE TABLET BY MOUTH ONE TIME DAILY , Disp: 90 tablet, Rfl: 1  Medications Discontinued During This Encounter   Medication Reason   • CloNIDine (CATAPRES) 0.1 MG tablet Not Efficacious       Review of Systems   Constitutional: Negative.    Respiratory: Negative.    Cardiovascular: Negative.    Gastrointestinal: Negative.    Genitourinary: Negative.    Musculoskeletal: Positive for arthralgias and myalgias.   Neurological: Negative.    Psychiatric/Behavioral: Negative for dysphoric mood and sleep disturbance.   All other systems reviewed and are negative.            /74   Pulse 77   Resp 16   Ht 160 cm (63\")   SpO2 98%       Physical Exam   Constitutional: She is oriented to person, place, and time. She appears well-developed and well-nourished.   HENT:   Head: Normocephalic and atraumatic.   Right Ear: External ear normal.   Left Ear: External ear normal.   Eyes: Pupils are equal, round, and reactive " to light. EOM are normal.   Neck: Normal range of motion. Neck supple. No tracheal deviation present. No thyromegaly present.   Cardiovascular: Normal rate, regular rhythm and intact distal pulses.  Exam reveals friction rub.    No murmur heard.  Pulmonary/Chest: Effort normal and breath sounds normal.   Musculoskeletal: She exhibits no edema.   Neurological: She is alert and oriented to person, place, and time.   Skin: Skin is warm and dry. Capillary refill takes less than 2 seconds.   Psychiatric: She has a normal mood and affect. Her behavior is normal.   Vitals reviewed.      Lab Results (most recent)     None          Results for orders placed or performed in visit on 05/03/18   CK   Result Value Ref Range    Creatine Kinase 90 26 - 142 U/L   DAYA   Result Value Ref Range    DAYA Direct Negative Negative   Sedimentation rate, automated   Result Value Ref Range    Sed Rate 10 0 - 30 mm/hr   T3, free   Result Value Ref Range    T3, Free 2.5 2.0 - 4.4 pg/mL   T4, free   Result Value Ref Range    Free T4 1.14 0.93 - 1.70 ng/dL           Becca was seen today for hyperlipidemia and hypothyroidism.    Diagnoses and all orders for this visit:    Benign essential HTN    Arthralgia of multiple joints      Try to bike or do pilates daily  Goal is 6 days per week  Try to stay on cymbalta to help her pain while exercising regularly.        Return in about 6 months (around 2/7/2019).    Gen Sultana MD  08/07/2018

## 2018-09-06 ENCOUNTER — TELEPHONE (OUTPATIENT)
Dept: INTERNAL MEDICINE | Facility: CLINIC | Age: 58
End: 2018-09-06

## 2018-09-06 ENCOUNTER — OFFICE VISIT (OUTPATIENT)
Dept: INTERNAL MEDICINE | Facility: CLINIC | Age: 58
End: 2018-09-06

## 2018-09-06 ENCOUNTER — HOSPITAL ENCOUNTER (OUTPATIENT)
Dept: ULTRASOUND IMAGING | Facility: HOSPITAL | Age: 58
Discharge: HOME | End: 2018-09-06
Attending: INTERNAL MEDICINE | Admitting: INTERNAL MEDICINE

## 2018-09-06 VITALS
SYSTOLIC BLOOD PRESSURE: 108 MMHG | RESPIRATION RATE: 16 BRPM | HEART RATE: 76 BPM | DIASTOLIC BLOOD PRESSURE: 70 MMHG | BODY MASS INDEX: 28.79 KG/M2 | OXYGEN SATURATION: 98 % | HEIGHT: 63 IN | WEIGHT: 162.5 LBS

## 2018-09-06 DIAGNOSIS — M79.661 RIGHT CALF PAIN: Primary | ICD-10-CM

## 2018-09-06 DIAGNOSIS — M79.661 PAIN OF RIGHT CALF: Primary | ICD-10-CM

## 2018-09-06 PROCEDURE — 99213 OFFICE O/P EST LOW 20 MIN: CPT | Performed by: INTERNAL MEDICINE

## 2018-09-06 PROCEDURE — 93971 EXTREMITY STUDY: CPT

## 2018-09-06 NOTE — PROGRESS NOTES
"      Becca Reilly is a 57 y.o. female, who presents with a chief complaint of   Chief Complaint   Patient presents with   • Leg Pain       HPI   58 yo female with pmhx hypothyroidism, HTN, OA knees. Had recent road trip with residual calf pain since that time. She rode 20 miles on her bike to try to work it out without relief.  She is on aspirin.  She denies any cp or palpitations. Daughter is an RN, they are concerning about DVTs.     The following portions of the patient's history were reviewed and updated as appropriate: allergies, current medications, past family history, past medical history, past social history, past surgical history and problem list.    Allergies: Patient has no known allergies.    Current Outpatient Prescriptions:   •  amLODIPine (NORVASC) 10 MG tablet, TAKE ONE TABLET BY MOUTH ONE TIME DAILY , Disp: 90 tablet, Rfl: 2  •  aspirin 81 MG tablet, Take  by mouth., Disp: , Rfl:   •  benazepril (LOTENSIN) 20 MG tablet, TAKE ONE TABLET BY MOUTH ONE TIME DAILY , Disp: 90 tablet, Rfl: 2  •  DULoxetine (CYMBALTA) 30 MG capsule, TAKE ONE CAPSULE BY MOUTH ONE TIME DAILY , Disp: 90 capsule, Rfl: 1  •  levothyroxine (SYNTHROID, LEVOTHROID) 50 MCG tablet, TAKE ONE TABLET BY MOUTH ONE TIME DAILY , Disp: 90 tablet, Rfl: 1  There are no discontinued medications.    Review of Systems   Constitutional: Negative.    HENT: Negative.    Eyes: Negative.    Respiratory: Negative for choking, chest tightness and shortness of breath.    Cardiovascular: Negative.    Gastrointestinal: Negative.    Endocrine: Negative.    Genitourinary: Negative.    Musculoskeletal: Positive for arthralgias. Negative for gait problem.        Calf pain   Skin: Negative.    Neurological: Negative.    Hematological: Negative.    Psychiatric/Behavioral: Negative.    All other systems reviewed and are negative.            /70   Pulse 76   Resp 16   Ht 160 cm (63\")   Wt 73.7 kg (162 lb 8 oz)   SpO2 98%   BMI 28.79 kg/m² "       Physical Exam   Constitutional: She is oriented to person, place, and time. She appears well-developed and well-nourished.   HENT:   Head: Normocephalic and atraumatic.   Right Ear: External ear normal.   Left Ear: External ear normal.   Eyes: Pupils are equal, round, and reactive to light. EOM are normal. Right eye exhibits no discharge. Left eye exhibits no discharge.   Neck: Normal range of motion. Neck supple. No tracheal deviation present. No thyromegaly present.   Cardiovascular: Normal rate, regular rhythm, normal heart sounds and intact distal pulses.    No murmur heard.  Pulmonary/Chest: Effort normal and breath sounds normal. No respiratory distress.   Musculoskeletal: She exhibits tenderness. She exhibits no edema.   Neurological: She is alert and oriented to person, place, and time.   Skin: Skin is warm and dry. Capillary refill takes less than 2 seconds.   Psychiatric: She has a normal mood and affect. Her behavior is normal.   Nursing note and vitals reviewed.      Lab Results (most recent)     None          Results for orders placed or performed in visit on 05/03/18   CK   Result Value Ref Range    Creatine Kinase 90 26 - 142 U/L   DAYA   Result Value Ref Range    DAYA Direct Negative Negative   Sedimentation rate, automated   Result Value Ref Range    Sed Rate 10 0 - 30 mm/hr   T3, free   Result Value Ref Range    T3, Free 2.5 2.0 - 4.4 pg/mL   T4, free   Result Value Ref Range    Free T4 1.14 0.93 - 1.70 ng/dL           Becca was seen today for leg pain.    Diagnoses and all orders for this visit:    Right calf pain  -     US Venous Doppler Lower Extremity Right (duplex)    Low risk for DVT, on aspirin  Reassurance for now, will call with result  No tachycardia. Discussed high risk symptoms and when to go to ER.   May just be arthritis pain vs ruptured Bakers  NSAIDs and rest if that is the case    Return if symptoms worsen or fail to improve.    Gen Sultana MD  09/06/2018

## 2018-09-06 NOTE — PATIENT INSTRUCTIONS
Becca was seen today for leg pain.    Diagnoses and all orders for this visit:    Right calf pain  -     US Venous Doppler Lower Extremity Right (duplex)    Low risk for DVT, on aspirin  Reassurance for now, will call with result  No tachycardia. Discussed high risk symptoms and when to go to ER.   May just be arthritis pain vs ruptured Bakers  NSAIDs and rest if that is the case      Baker Cyst  A Baker cyst, also called a popliteal cyst, is a sac-like growth that forms at the back of the knee. The cyst forms when the fluid-filled sac (bursa) that cushions the knee joint becomes enlarged. The bursa that becomes a Baker cyst is located at the back of the knee joint.  What are the causes?  In most cases, a Baker cyst results from another knee problem that causes swelling inside the knee. This makes the fluid inside the knee joint (synovial fluid) flow into the bursa behind the knee, causing the bursa to enlarge.  What increases the risk?  You may be more likely to develop a Baker cyst if you already have a knee problem, such as:  · A tear in cartilage that cushions the knee joint (meniscal tear).  · A tear in the tissues that connect the bones of the knee joint (ligament tear).  · Knee swelling from osteoarthritis, rheumatoid arthritis, or gout.    What are the signs or symptoms?  A Baker cyst does not always cause symptoms. A lump behind the knee may be the only sign of the condition. The lump may be painful, especially when the knee is straightened. If the lump is painful, the pain may come and go. The knee may also be stiff.  Symptoms may quickly get more severe if the cyst breaks open (ruptures). If your cyst ruptures, signs and symptoms may affect the knee and the back of the lower leg (calf) and may include:  · Sudden or worsening pain.  · Swelling.  · Bruising.    How is this diagnosed?  This condition may be diagnosed based on your symptoms and medical history. Your health care provider will also do a  physical exam. This may include:  · Feeling the cyst to check whether it is tender.  · Checking your knee for signs of another knee condition that causes swelling.    You may have imaging tests, such as:  · X-rays.  · MRI.  · Ultrasound.    How is this treated?  A Baker cyst that is not painful may go away without treatment. If the cyst gets large or painful, it will likely get better if the underlying knee problem is treated.  Treatment for a Baker cyst may include:  · Resting.  · Keeping weight off of the knee. This means not leaning on the knee to support your body weight.  · NSAIDs to reduce pain and swelling.  · A procedure to drain the fluid from the cyst with a needle (aspiration). You may also get an injection of a medicine that reduces swelling (steroid).  · Surgery. This may be needed if other treatments do not work. This usually involves correcting knee damage and removing the cyst.    Follow these instructions at home:  · Take over-the-counter and prescription medicines only as told by your health care provider.  · Rest and return to your normal activities as told by your health care provider. Avoid activities that make pain or swelling worse. Ask your health care provider what activities are safe for you.  · Keep all follow-up visits as told by your health care provider. This is important.  Contact a health care provider if:  · You have knee pain, stiffness, or swelling that does not get better.  Get help right away if:  · You have sudden or worsening pain and swelling in your calf area.  This information is not intended to replace advice given to you by your health care provider. Make sure you discuss any questions you have with your health care provider.  Document Released: 12/18/2006 Document Revised: 09/07/2017 Document Reviewed: 09/07/2017  [employer] Patient Education © 2018 [employer]

## 2018-09-06 NOTE — TELEPHONE ENCOUNTER
----- Message from Gen Sultana MD sent at 9/6/2018 10:34 AM EDT -----  No need to call patient, I spoke with her. Supportive care for now. FU if not better in a few weeks. Ok to exercise.

## 2018-09-26 RX ORDER — AMLODIPINE BESYLATE 10 MG/1
TABLET ORAL
Qty: 90 TABLET | Refills: 1 | Status: SHIPPED | OUTPATIENT
Start: 2018-09-26 | End: 2019-03-19 | Stop reason: SDUPTHER

## 2018-09-26 RX ORDER — BENAZEPRIL HYDROCHLORIDE 20 MG/1
TABLET ORAL
Qty: 90 TABLET | Refills: 1 | Status: SHIPPED | OUTPATIENT
Start: 2018-09-26 | End: 2019-03-19 | Stop reason: SDUPTHER

## 2018-11-26 DIAGNOSIS — M25.50 ARTHRALGIA OF MULTIPLE JOINTS: Primary | ICD-10-CM

## 2018-11-26 DIAGNOSIS — M25.561 RECURRENT PAIN OF RIGHT KNEE: ICD-10-CM

## 2018-11-27 DIAGNOSIS — M25.50 ARTHRALGIA OF MULTIPLE JOINTS: Primary | ICD-10-CM

## 2018-11-27 DIAGNOSIS — M25.561 RECURRENT PAIN OF RIGHT KNEE: ICD-10-CM

## 2018-12-28 RX ORDER — LEVOTHYROXINE SODIUM 0.05 MG/1
TABLET ORAL
Qty: 30 TABLET | Refills: 0 | Status: SHIPPED | OUTPATIENT
Start: 2018-12-28 | End: 2019-03-19

## 2019-03-19 ENCOUNTER — OFFICE VISIT (OUTPATIENT)
Dept: INTERNAL MEDICINE | Facility: CLINIC | Age: 59
End: 2019-03-19

## 2019-03-19 VITALS
SYSTOLIC BLOOD PRESSURE: 108 MMHG | HEIGHT: 63 IN | HEART RATE: 68 BPM | BODY MASS INDEX: 28.53 KG/M2 | RESPIRATION RATE: 16 BRPM | DIASTOLIC BLOOD PRESSURE: 72 MMHG | TEMPERATURE: 97.9 F | OXYGEN SATURATION: 98 % | WEIGHT: 161 LBS

## 2019-03-19 DIAGNOSIS — E03.8 HYPOTHYROIDISM DUE TO HASHIMOTO'S THYROIDITIS: ICD-10-CM

## 2019-03-19 DIAGNOSIS — E78.2 MIXED HYPERLIPIDEMIA: ICD-10-CM

## 2019-03-19 DIAGNOSIS — I10 BENIGN ESSENTIAL HTN: Primary | ICD-10-CM

## 2019-03-19 DIAGNOSIS — Z79.890 HORMONE REPLACEMENT THERAPY (HRT): ICD-10-CM

## 2019-03-19 DIAGNOSIS — E06.3 HYPOTHYROIDISM DUE TO HASHIMOTO'S THYROIDITIS: ICD-10-CM

## 2019-03-19 DIAGNOSIS — I67.1 BRAIN ANEURYSM: ICD-10-CM

## 2019-03-19 DIAGNOSIS — R73.01 IFG (IMPAIRED FASTING GLUCOSE): ICD-10-CM

## 2019-03-19 PROBLEM — M17.10 ARTHRITIS OF KNEE: Status: ACTIVE | Noted: 2018-12-18

## 2019-03-19 PROBLEM — Z12.11 SCREEN FOR COLON CANCER: Status: ACTIVE | Noted: 2017-03-15

## 2019-03-19 PROCEDURE — 99214 OFFICE O/P EST MOD 30 MIN: CPT | Performed by: INTERNAL MEDICINE

## 2019-03-19 RX ORDER — BENAZEPRIL HYDROCHLORIDE 20 MG/1
20 TABLET ORAL DAILY
Qty: 90 TABLET | Refills: 1 | Status: SHIPPED | OUTPATIENT
Start: 2019-03-19 | End: 2021-02-05 | Stop reason: SDUPTHER

## 2019-03-19 RX ORDER — ESTRADIOL 0.04 MG/D
FILM, EXTENDED RELEASE TRANSDERMAL
COMMUNITY
Start: 2019-03-05 | End: 2022-06-28

## 2019-03-19 RX ORDER — LEVOTHYROXINE, LIOTHYRONINE 38; 9 UG/1; UG/1
TABLET ORAL
COMMUNITY
Start: 2019-03-08 | End: 2021-02-01

## 2019-03-19 RX ORDER — AMLODIPINE BESYLATE 10 MG/1
10 TABLET ORAL DAILY
Qty: 90 TABLET | Refills: 1 | Status: SHIPPED | OUTPATIENT
Start: 2019-03-19 | End: 2021-02-05 | Stop reason: SDUPTHER

## 2019-03-19 NOTE — PROGRESS NOTES
Becca Reilly is a 58 y.o. female, who presents with a chief complaint of   Chief Complaint   Patient presents with   • Follow-up     6 x month f/u, Dr. Sultana transfer   • Hypertension       57 yo F here to establish care and all of her issues are new to me. She was a previous patient of Dr. Sultana. She has been seeing Dr. Mednez for 2nd opinion on diet and synthroid. She has been on synthroid or [place] for close to 2 years prior to seeing Dr. Sultana. She feels well on this medication now. No symptoms of low thyroid. She is fatigued more than she would expect.     Her gynecologist (Dr. Lopez) put her on oral estrogen since age 46. She is on low dose. She has been off of them in the past and had severe hot flashes.She has been back on those for about 3 months.She feels better on them including her sleep and vaginal dryness. Her pap smear and mammogram are up to date and Dr. Lopez manages.     She has chronic HTN and is doing well on Norvasc and Lotensin. She doesn't check this at home. She doesn't get dizzy or have CP.     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 This was found incidentally on a scan and never caused her issues. She is currently asymptomatci from this.          The following portions of the patient's history were reviewed and updated as appropriate: allergies, current medications, past family history, past medical history, past social history, past surgical history and problem list.    Allergies: Patient has no known allergies.    Current Outpatient Medications:   •  amLODIPine (NORVASC) 10 MG tablet, Take 1 tablet by mouth Daily., Disp: 90 tablet, Rfl: 1  •  aspirin 81 MG tablet, Take  by mouth., Disp: , Rfl:   •  benazepril (LOTENSIN) 20 MG tablet, Take 1 tablet by mouth Daily., Disp: 90 tablet, Rfl: 1  •  estradiol (VIVELLE-DOT) 0.0375 MG/24HR, , Disp: , Rfl:   •  NP THYROID 60 MG tablet, , Disp: , Rfl:   •  progesterone (PROMETRIUM) 200 MG capsule, , Disp: ,  "Rfl:   Medications Discontinued During This Encounter   Medication Reason   • levothyroxine (SYNTHROID, LEVOTHROID) 50 MCG tablet *Therapy completed   • DULoxetine (CYMBALTA) 30 MG capsule *Therapy completed   • benazepril (LOTENSIN) 20 MG tablet Reorder   • amLODIPine (NORVASC) 10 MG tablet Reorder       Review of Systems   Constitutional: Negative for chills and fatigue.   HENT: Negative for congestion and sneezing.    Respiratory: Negative for cough and shortness of breath.    Gastrointestinal: Negative for abdominal pain, constipation, diarrhea and nausea.   Endocrine: Positive for heat intolerance.   Skin: Negative for rash.   Neurological: Negative for dizziness and headaches.   Hematological: Negative for adenopathy.             /72 (BP Location: Left arm, Patient Position: Sitting, Cuff Size: Adult)   Pulse 68   Temp 97.9 °F (36.6 °C) (Oral)   Resp 16   Ht 160 cm (63\")   Wt 73 kg (161 lb)   SpO2 98%   BMI 28.52 kg/m²       Physical Exam   Constitutional: She is oriented to person, place, and time. She appears well-developed and well-nourished. No distress.   HENT:   Head: Normocephalic and atraumatic.   Right Ear: External ear normal.   Left Ear: External ear normal.   Mouth/Throat: Oropharynx is clear and moist. No oropharyngeal exudate.   Eyes: Conjunctivae are normal. Right eye exhibits no discharge. Left eye exhibits no discharge. No scleral icterus.   Neck: Neck supple.   Cardiovascular: Normal rate, regular rhythm and normal heart sounds. Exam reveals no gallop and no friction rub.   No murmur heard.  Pulmonary/Chest: Effort normal and breath sounds normal. No respiratory distress. She has no wheezes. She has no rales.   Lymphadenopathy:     She has no cervical adenopathy.   Neurological: She is alert and oriented to person, place, and time.   Skin: Skin is warm. No rash noted.   Psychiatric: She has a normal mood and affect. Her behavior is normal.   Nursing note and vitals " reviewed.        Results for orders placed or performed in visit on 05/03/18   CK   Result Value Ref Range    Creatine Kinase 90 26 - 142 U/L   DAYA   Result Value Ref Range    DAYA Direct Negative Negative   Sedimentation rate, automated   Result Value Ref Range    Sed Rate 10 0 - 30 mm/hr   T3, free   Result Value Ref Range    T3, Free 2.5 2.0 - 4.4 pg/mL   T4, free   Result Value Ref Range    Free T4 1.14 0.93 - 1.70 ng/dL           Becca was seen today for follow-up and hypertension.    Diagnoses and all orders for this visit:    Benign essential HTN    Mixed hyperlipidemia    Hypothyroidism due to Hashimoto's thyroiditis    Brain aneurysm    IFG (impaired fasting glucose)    Hormone replacement therapy (HRT)    Other orders  -     benazepril (LOTENSIN) 20 MG tablet; Take 1 tablet by mouth Daily.  -     amLODIPine (NORVASC) 10 MG tablet; Take 1 tablet by mouth Daily.    HTN is under good control and patient will continue to monitor with home BP cuff. No side effects from medications. Will continue current regimen of Lotensin and Norvasc. Will follow up in 6 months      Will continue to work on diet and exercise. Reviewed labs that she had done at Dr. Mendez's office and they are slightly elevated.     Patient's BG's have been elevated and their HgA1c is 5.7%. Will continue to focus on diet and exercise. Will follow up in 6 months.     HRT is being managed by Dr. Lopez    Need to get MRA results and will determine at that time once I review if she needs more imaging to make sure that this is stable.       Return in about 6 months (around 9/19/2019) for Recheck.    Anamika Carr MD  03/19/2019

## 2021-02-01 ENCOUNTER — TELEPHONE (OUTPATIENT)
Dept: INTERNAL MEDICINE | Facility: CLINIC | Age: 61
End: 2021-02-01

## 2021-02-01 RX ORDER — LEVOTHYROXINE SODIUM 0.1 MG/1
100 TABLET ORAL DAILY
Qty: 90 TABLET | Refills: 1 | Status: SHIPPED | OUTPATIENT
Start: 2021-02-01 | End: 2021-02-05 | Stop reason: SDUPTHER

## 2021-02-01 NOTE — TELEPHONE ENCOUNTER
PATIENT IS REQUESTING A REFILL.     PATIENT STATES THAT HER PHARMACY INFORMED HER THAT THERE WERE NO REFILLS OF HER levothyroxine (SYNTHROID, LEVOTHROID) 100 MCG tablet REMAINING.    PATIENT CAN BE REACHED AT  [phone]     [facility] CONFIRMED PHARMACY  [id number] - [place], [place] - [address]. - [phone]  - [phone]   [phone]

## 2021-02-04 ENCOUNTER — OFFICE VISIT (OUTPATIENT)
Dept: INTERNAL MEDICINE | Facility: CLINIC | Age: 61
End: 2021-02-04

## 2021-02-04 VITALS
DIASTOLIC BLOOD PRESSURE: 78 MMHG | WEIGHT: 176 LBS | SYSTOLIC BLOOD PRESSURE: 124 MMHG | BODY MASS INDEX: 31.18 KG/M2 | RESPIRATION RATE: 16 BRPM | TEMPERATURE: 95.6 F | OXYGEN SATURATION: 97 % | HEART RATE: 77 BPM | HEIGHT: 63 IN

## 2021-02-04 DIAGNOSIS — M18.11 OSTEOARTHRITIS OF CARPOMETACARPAL (CMC) JOINT OF RIGHT THUMB, UNSPECIFIED OSTEOARTHRITIS TYPE: ICD-10-CM

## 2021-02-04 DIAGNOSIS — E06.3 HYPOTHYROIDISM DUE TO HASHIMOTO'S THYROIDITIS: ICD-10-CM

## 2021-02-04 DIAGNOSIS — E03.8 HYPOTHYROIDISM DUE TO HASHIMOTO'S THYROIDITIS: ICD-10-CM

## 2021-02-04 DIAGNOSIS — R73.03 PREDIABETES: ICD-10-CM

## 2021-02-04 DIAGNOSIS — E78.2 MODERATE MIXED HYPERLIPIDEMIA NOT REQUIRING STATIN THERAPY: Chronic | ICD-10-CM

## 2021-02-04 DIAGNOSIS — I10 BENIGN ESSENTIAL HTN: Primary | ICD-10-CM

## 2021-02-04 PROCEDURE — 99214 OFFICE O/P EST MOD 30 MIN: CPT | Performed by: INTERNAL MEDICINE

## 2021-02-04 NOTE — ASSESSMENT & PLAN NOTE
CONTROLLED  - cont on current anti-HTN regimen with CCB and ACEi--> refills sent today  - Cont checking BP at home intermittently, call if values trend outside of goal range

## 2021-02-04 NOTE — ASSESSMENT & PLAN NOTE
CONTROLLED  - TSH today  - cont current LTX dose, will adjust as indicated based on labs and send refills once appropriate

## 2021-02-04 NOTE — ASSESSMENT & PLAN NOTE
STABLE  - FLP today for ongoing monitoring, will discuss statin initiation as indicated based on FLP and ASCVD score

## 2021-02-04 NOTE — PROGRESS NOTES
"Chief Complaint  Med Refill, Hypertension, and Hypothyroidism    Subjective          Becca Reilly presents to [facility] for med refills and follow up on hyeprtension and hypothyroidism. Taking medications daily without any issues. No side effects from medications. Does not check BP at home routinely, always in goal range when she does check. No chest pain, headaches, vision changes.   No symptoms of over/underactive thyroid concerns.     Objective   Vital Signs:     /78   Pulse 77   Temp 95.6 °F (35.3 °C)   Resp 16   Ht 160 cm (63\")   Wt 79.8 kg (176 lb)   SpO2 97%   BMI 31.18 kg/m²     Physical Exam  Vitals signs and nursing note reviewed.   Constitutional:       General: She is not in acute distress.     Appearance: Normal appearance.   Neck:      Comments: No thyromegaly, nodules  Cardiovascular:      Rate and Rhythm: Normal rate and regular rhythm.      Pulses: Normal pulses.      Heart sounds: Normal heart sounds. No murmur.   Pulmonary:      Effort: Pulmonary effort is normal. No respiratory distress.      Breath sounds: Normal breath sounds.   Abdominal:      General: Abdomen is flat. Bowel sounds are normal.      Palpations: Abdomen is soft.      Tenderness: There is no abdominal tenderness.   Musculoskeletal:      Right lower leg: No edema.      Left lower leg: No edema.   Neurological:      Mental Status: She is alert and oriented to person, place, and time. Mental status is at baseline.   Psychiatric:         Mood and Affect: Mood normal.         Behavior: Behavior normal.        Result Review :   The following data was reviewed by: Suha Gr MD on 02/04/2021:  Labs: CBC, CMP, A1C, FLP, TSH, Vit D, HIV in 7/2020 in [facility]    Assessment and Plan      Diagnoses and all orders for this visit:    1. Benign essential HTN (Primary)  Assessment & Plan:  CONTROLLED  - cont on current anti-HTN regimen with CCB and ACEi--> refills sent " today  - Cont checking BP at home intermittently, call if values trend outside of goal range      Orders:  -     Comprehensive Metabolic Panel    2. Moderate mixed hyperlipidemia not requiring statin therapy  Assessment & Plan:  STABLE  - FLP today for ongoing monitoring, will discuss statin initiation as indicated based on FLP and ASCVD score    Orders:  -     Lipid Panel    3. Hypothyroidism due to Hashimoto's thyroiditis  Assessment & Plan:  CONTROLLED  - TSH today  - cont current LTX dose, will adjust as indicated based on labs and send refills once appropriate    Orders:  -     TSH    4. Prediabetes  Assessment & Plan:  STABLE  - A1C check today, will call with results and make treatment recommendations as needed    Orders:  -     Hemoglobin A1c    5. Osteoarthritis of carpometacarpal (CMC) joint of right thumb, unspecified osteoarthritis type  Assessment & Plan:  UNCONTROLLED  - rec pt return to hand surgeon to discuss localized pain management options like injection therapy        Follow Up   Return in about 6 months (around 8/4/2021) for Annual physical.    Patient was given instructions and counseling regarding her condition or for health maintenance advice. Please see specific information pulled into the AVS if appropriate.     Vashti Gr MD  [facility]  Phone: [phone]  Fax: [phone]

## 2021-02-04 NOTE — ASSESSMENT & PLAN NOTE
UNCONTROLLED  - rec pt return to hand surgeon to discuss localized pain management options like injection therapy

## 2021-02-05 LAB
ALBUMIN SERPL-MCNC: 4.4 G/DL (ref 3.5–5.2)
ALBUMIN/GLOB SERPL: 1.9 G/DL
ALP SERPL-CCNC: 59 U/L (ref 39–117)
ALT SERPL-CCNC: 13 U/L (ref 1–33)
AST SERPL-CCNC: 18 U/L (ref 1–32)
BILIRUB SERPL-MCNC: 0.2 MG/DL (ref 0–1.2)
BUN SERPL-MCNC: 18 MG/DL (ref 8–23)
BUN/CREAT SERPL: 23.4 (ref 7–25)
CALCIUM SERPL-MCNC: 9.7 MG/DL (ref 8.6–10.5)
CHLORIDE SERPL-SCNC: 102 MMOL/L (ref 98–107)
CHOLEST SERPL-MCNC: 254 MG/DL (ref 0–200)
CO2 SERPL-SCNC: 29.6 MMOL/L (ref 22–29)
CREAT SERPL-MCNC: 0.77 MG/DL (ref 0.57–1)
GLOBULIN SER CALC-MCNC: 2.3 GM/DL
GLUCOSE SERPL-MCNC: 112 MG/DL (ref 65–99)
HBA1C MFR BLD: 5.8 % (ref 4.8–5.6)
HDLC SERPL-MCNC: 84 MG/DL (ref 40–60)
LDLC SERPL CALC-MCNC: 146 MG/DL (ref 0–100)
POTASSIUM SERPL-SCNC: 3.9 MMOL/L (ref 3.5–5.2)
PROT SERPL-MCNC: 6.7 G/DL (ref 6–8.5)
SODIUM SERPL-SCNC: 140 MMOL/L (ref 136–145)
TRIGL SERPL-MCNC: 139 MG/DL (ref 0–150)
TSH SERPL DL<=0.005 MIU/L-ACNC: 0.88 UIU/ML (ref 0.27–4.2)
VLDLC SERPL CALC-MCNC: 24 MG/DL (ref 5–40)

## 2021-02-05 RX ORDER — BENAZEPRIL HYDROCHLORIDE 20 MG/1
20 TABLET ORAL DAILY
Qty: 90 TABLET | Refills: 1 | Status: SHIPPED | OUTPATIENT
Start: 2021-02-05 | End: 2021-07-14 | Stop reason: SDUPTHER

## 2021-02-05 RX ORDER — LEVOTHYROXINE SODIUM 0.1 MG/1
100 TABLET ORAL DAILY
Qty: 90 TABLET | Refills: 1 | Status: SHIPPED | OUTPATIENT
Start: 2021-02-05 | End: 2021-07-14 | Stop reason: SDUPTHER

## 2021-02-05 RX ORDER — AMLODIPINE BESYLATE 10 MG/1
10 TABLET ORAL DAILY
Qty: 90 TABLET | Refills: 1 | Status: SHIPPED | OUTPATIENT
Start: 2021-02-05 | End: 2021-07-14 | Stop reason: SDUPTHER

## 2021-07-14 ENCOUNTER — TELEPHONE (OUTPATIENT)
Dept: INTERNAL MEDICINE | Facility: CLINIC | Age: 61
End: 2021-07-14

## 2021-07-14 RX ORDER — LEVOTHYROXINE SODIUM 0.1 MG/1
100 TABLET ORAL DAILY
Qty: 90 TABLET | Refills: 1 | Status: SHIPPED | OUTPATIENT
Start: 2021-07-14 | End: 2022-04-07

## 2021-07-14 RX ORDER — BENAZEPRIL HYDROCHLORIDE 20 MG/1
20 TABLET ORAL DAILY
Qty: 90 TABLET | Refills: 1 | Status: SHIPPED | OUTPATIENT
Start: 2021-07-14 | End: 2021-08-16 | Stop reason: SDUPTHER

## 2021-07-14 RX ORDER — AMLODIPINE BESYLATE 10 MG/1
10 TABLET ORAL DAILY
Qty: 90 TABLET | Refills: 1 | Status: SHIPPED | OUTPATIENT
Start: 2021-07-14 | End: 2021-08-16 | Stop reason: SDUPTHER

## 2021-07-14 NOTE — TELEPHONE ENCOUNTER
Caller: Melba Becca KWAN    Relationship: Self    Best call back number: [phone]     Medication needed:   Requested Prescriptions     Pending Prescriptions Disp Refills   • levothyroxine (Synthroid) 100 MCG tablet 90 tablet 1     Sig: Take 1 tablet by mouth Daily.   • amLODIPine (NORVASC) 10 MG tablet 90 tablet 1     Sig: Take 1 tablet by mouth Daily.   • benazepril (LOTENSIN) 20 MG tablet 90 tablet 1     Sig: Take 1 tablet by mouth Daily.       When do you need the refill by:ASAP  What additional details did the patient provide when requesting the medication: WILL BE OUT BEFORE SHE CAN BE SEEN     Does the patient have less than a 3 day supply:  [x] Yes  [] No    What is the patient's preferred pharmacy: [id number] - [facility] [address].  [phone] [facility] [phone] [place]

## 2021-08-16 ENCOUNTER — OFFICE VISIT (OUTPATIENT)
Dept: INTERNAL MEDICINE | Facility: CLINIC | Age: 61
End: 2021-08-16

## 2021-08-16 VITALS
DIASTOLIC BLOOD PRESSURE: 80 MMHG | WEIGHT: 173 LBS | HEART RATE: 69 BPM | HEIGHT: 63 IN | OXYGEN SATURATION: 96 % | BODY MASS INDEX: 30.65 KG/M2 | SYSTOLIC BLOOD PRESSURE: 130 MMHG | RESPIRATION RATE: 16 BRPM | TEMPERATURE: 96.6 F

## 2021-08-16 DIAGNOSIS — E06.3 HYPOTHYROIDISM DUE TO HASHIMOTO'S THYROIDITIS: ICD-10-CM

## 2021-08-16 DIAGNOSIS — R73.9 HYPERGLYCEMIA: ICD-10-CM

## 2021-08-16 DIAGNOSIS — Z11.59 ENCOUNTER FOR HEPATITIS C SCREENING TEST FOR LOW RISK PATIENT: ICD-10-CM

## 2021-08-16 DIAGNOSIS — R31.9 HEMATURIA, UNSPECIFIED TYPE: ICD-10-CM

## 2021-08-16 DIAGNOSIS — G25.81 RLS (RESTLESS LEGS SYNDROME): ICD-10-CM

## 2021-08-16 DIAGNOSIS — I10 BENIGN ESSENTIAL HTN: ICD-10-CM

## 2021-08-16 DIAGNOSIS — E78.2 MODERATE MIXED HYPERLIPIDEMIA NOT REQUIRING STATIN THERAPY: Chronic | ICD-10-CM

## 2021-08-16 DIAGNOSIS — E03.8 HYPOTHYROIDISM DUE TO HASHIMOTO'S THYROIDITIS: ICD-10-CM

## 2021-08-16 DIAGNOSIS — Z00.00 ROUTINE GENERAL MEDICAL EXAMINATION AT A HEALTH CARE FACILITY: Primary | ICD-10-CM

## 2021-08-16 LAB
BILIRUB BLD-MCNC: NEGATIVE MG/DL
CLARITY, POC: CLEAR
COLOR UR: YELLOW
GLUCOSE UR STRIP-MCNC: NEGATIVE MG/DL
KETONES UR QL: NEGATIVE
LEUKOCYTE EST, POC: NEGATIVE
NITRITE UR-MCNC: NEGATIVE MG/ML
PH UR: 6 [PH] (ref 5–8)
PROT UR STRIP-MCNC: NEGATIVE MG/DL
RBC # UR STRIP: ABNORMAL /UL
SP GR UR: 1.01 (ref 1–1.03)
UROBILINOGEN UR QL: NORMAL

## 2021-08-16 PROCEDURE — 99396 PREV VISIT EST AGE 40-64: CPT | Performed by: INTERNAL MEDICINE

## 2021-08-16 PROCEDURE — 81003 URINALYSIS AUTO W/O SCOPE: CPT | Performed by: INTERNAL MEDICINE

## 2021-08-16 RX ORDER — ROPINIROLE 0.25 MG/1
0.25 TABLET, FILM COATED ORAL NIGHTLY
Qty: 90 TABLET | Refills: 1 | Status: SHIPPED | OUTPATIENT
Start: 2021-08-16

## 2021-08-16 RX ORDER — BENAZEPRIL HYDROCHLORIDE 20 MG/1
20 TABLET ORAL DAILY
Qty: 90 TABLET | Refills: 1 | Status: SHIPPED | OUTPATIENT
Start: 2021-08-16 | End: 2021-12-21 | Stop reason: SDUPTHER

## 2021-08-16 RX ORDER — AMLODIPINE BESYLATE 10 MG/1
10 TABLET ORAL DAILY
Qty: 90 TABLET | Refills: 1 | Status: SHIPPED | OUTPATIENT
Start: 2021-08-16 | End: 2021-12-21 | Stop reason: SDUPTHER

## 2021-08-16 RX ORDER — LIRAGLUTIDE 6 MG/ML
3 INJECTION, SOLUTION SUBCUTANEOUS DAILY
COMMUNITY
Start: 2021-08-10 | End: 2022-06-28

## 2021-08-16 RX ORDER — MELOXICAM 7.5 MG/1
TABLET ORAL
COMMUNITY
Start: 2021-07-21

## 2021-08-16 RX ORDER — PEN NEEDLE, DIABETIC 31 GX5/16"
1 NEEDLE, DISPOSABLE MISCELLANEOUS
COMMUNITY
Start: 2021-08-10 | End: 2022-06-28

## 2021-08-16 NOTE — PROGRESS NOTES
CC:  Annual Physical    Subjective   History of Present Illness    Becca Reilly is a 60 y.o. female here for annual wellness. Pt does not have acute issues to discuss today. States overall things are going well. Taking all meds as prescribed without any issues.   Having some aching in her legs, worse at night, do seem to hurt all the time, keep her awake at night.     The following portions of the patient's history were reviewed and updated as appropriate: allergies, current medications, past family history, past medical history, past social history, past surgical history, and problem list.    Patient Care Team:  Suha Gr MD as PCP - General (Internal Medicine & Pediatrics)  Titi Martel MD as Consulting Physician (Gastroenterology)  Opal Lopez MD as Consulting Physician (Obstetrics and Gynecology)  Daniel Rae MD as Consulting Physician (Orthopedic Surgery)    Review of Systems   Constitutional: Negative for activity change, chills and fever.   HENT: Negative for congestion, ear pain, rhinorrhea and sore throat.    Eyes: Negative for double vision, pain and visual disturbance.   Respiratory: Negative for cough, shortness of breath and wheezing.    Cardiovascular: Negative for chest pain, palpitations and leg swelling.   Gastrointestinal: Negative for abdominal pain, blood in stool, constipation, diarrhea, nausea and vomiting.   Endocrine: Negative for cold intolerance and heat intolerance.   Genitourinary: Negative for difficulty urinating, dysuria and frequency.   Musculoskeletal: Negative for arthralgias and myalgias.   Skin: Negative for rash and skin lesions.   Neurological: Negative for dizziness, tremors and headache.   Hematological: Negative for adenopathy. Does not bruise/bleed easily.   Psychiatric/Behavioral: Negative for behavioral problems and suicidal ideas.       Body mass index is 30.65 kg/m².   Vitals:    08/16/21 1534 08/16/21 1646   BP: 150/90 130/80   Pulse: 69  "   Resp: 16    Temp: 96.6 °F (35.9 °C)    SpO2: 96%    Weight: 78.5 kg (173 lb)    Height: 160 cm (63\")         Objective   Physical Exam  Vitals and nursing note reviewed.   Constitutional:       General: She is not in acute distress.     Appearance: Normal appearance.   HENT:      Head: Normocephalic and atraumatic.      Right Ear: Tympanic membrane and ear canal normal.      Left Ear: Tympanic membrane and ear canal normal.      Nose: Nose normal.      Mouth/Throat:      Mouth: Mucous membranes are moist.      Pharynx: Oropharynx is clear.   Eyes:      Extraocular Movements: Extraocular movements intact.      Conjunctiva/sclera: Conjunctivae normal.      Pupils: Pupils are equal, round, and reactive to light.   Cardiovascular:      Rate and Rhythm: Normal rate and regular rhythm.      Heart sounds: Normal heart sounds. No murmur heard.     Pulmonary:      Effort: Pulmonary effort is normal. No respiratory distress.      Breath sounds: Normal breath sounds. No wheezing or rhonchi.   Abdominal:      General: Bowel sounds are normal. There is no distension.      Palpations: Abdomen is soft. There is no mass.      Tenderness: There is no abdominal tenderness.      Comments: no hepatosplenomegaly   Musculoskeletal:         General: No deformity. Normal range of motion.      Cervical back: Normal range of motion and neck supple.   Skin:     General: Skin is warm and dry.      Capillary Refill: Capillary refill takes less than 2 seconds.      Findings: No lesion or rash.   Neurological:      General: No focal deficit present.      Mental Status: She is alert and oriented to person, place, and time.      Cranial Nerves: No cranial nerve deficit.   Psychiatric:         Mood and Affect: Mood normal.         Behavior: Behavior normal.         Judgment: Judgment normal.         Brief Urine Lab Results  (Last result in the past 365 days)      Color   Clarity   Blood   Leuk Est   Nitrite   Protein   CREAT   Urine HCG        " 08/16/21 1629 Yellow Clear Moderate Negative Negative Negative               Current Outpatient Medications:   •  amLODIPine (NORVASC) 10 MG tablet, Take 1 tablet by mouth Daily., Disp: 90 tablet, Rfl: 1  •  aspirin 81 MG tablet, Take  by mouth., Disp: , Rfl:   •  benazepril (LOTENSIN) 20 MG tablet, Take 1 tablet by mouth Daily., Disp: 90 tablet, Rfl: 1  •  levothyroxine (Synthroid) 100 MCG tablet, Take 1 tablet by mouth Daily., Disp: 90 tablet, Rfl: 1  •  meloxicam (MOBIC) 7.5 MG tablet, , Disp: , Rfl:   •  estradiol (VIVELLE-DOT) 0.0375 MG/24HR, , Disp: , Rfl:   •  Insulin Pen Needle (B-D ULTRAFINE III SHORT PEN) 31G X 8 MM misc, Inject 1 each under the skin into the appropriate area as directed., Disp: , Rfl:   •  Liraglutide (Saxenda) 18 MG/3ML injection pen, Inject 3 mg under the skin into the appropriate area as directed Daily., Disp: , Rfl:   •  progesterone (PROMETRIUM) 200 MG capsule, , Disp: , Rfl:   •  rOPINIRole (REQUIP) 0.25 MG tablet, Take 1 tablet by mouth Every Night. Take 1 hour before bedtime., Disp: 90 tablet, Rfl: 1     Lab Results   Component Value Date    HGBA1C 5.80 (H) 02/04/2021    TSH 0.884 02/04/2021    DMND55RP 53.3 12/12/2017        Health Maintenance   Topic Date Due   • INFLUENZA VACCINE  10/01/2021   • PAP SMEAR  10/16/2021   • LIPID PANEL  02/04/2022   • MAMMOGRAM  08/10/2023   • TDAP/TD VACCINES (2 - Td or Tdap) 08/26/2023   • ZOSTER VACCINE  Completed        Immunization History   Administered Date(s) Administered   • COVID-19 (PFIZER) 03/24/2021, 04/14/2021   • Flu Vaccine Quad PF >18YRS 10/18/2018   • Influenza, Unspecified 11/22/2019   • Shingrix 01/01/2019, 11/22/2019   • Tdap 08/26/2013       Assessment/Plan     1. Annual Wellness  - Labs ordered today including CBC, CMP, Fasting lipid panel, HgbA1C, TSH, Vit D and Hep C. Will call with results once available and make follow up plan and med changes as appropriate.   - Cont current medications for chronic medical issues,  refills sent as needed today.   - UA with blood--> will send for microscopy  - EKG done previously, reviewed and in chart  - PAP smear up to date and managed by gynecology. Last 08/2021 and negative with neg HPV  - Mammo up to date and managed by gynecology. Last 08/2021 and negative  - Cscope up to date and normal. Last done 04/2017 and negative, due to FHx rec repeat q5y, due 2022  - DEXA not yet indicated, due at 63 yo  - Lung CA screening not indicated  - Immunizations: Flu shot due Fall 2021. TDaP UTD, last 2013, due 2023. Shingrix series completed. COVID series completed.    - Depression screen reviewed with patient and negative.  - Advised behavioral modifications including dietary changes and increased exercise with goal of healthy weight and lifestyle.    2. Benign essential HTN  Assessment & Plan:  CONTROLLED  - BP not in goal range in office today initially but recheck down in normal range   - cont on ACEi and CCB at current doses--> refills sent  - Cont checking BP at home daily, call if values trend outside of goal range    Orders:  -     Comprehensive Metabolic Panel  -     POC Urinalysis Dipstick, Automated  -     amLODIPine (NORVASC) 10 MG tablet; Take 1 tablet by mouth Daily.  Dispense: 90 tablet; Refill: 1  -     benazepril (LOTENSIN) 20 MG tablet; Take 1 tablet by mouth Daily.  Dispense: 90 tablet; Refill: 1    3. Hypothyroidism due to Hashimoto's thyroiditis  Assessment & Plan:  CONTROLLED  - TSH today for ongoing monitoring  - cont on current dose of LTX for now, will adjust dose as indicated based on labs    Orders:  -     TSH    4. RLS (restless legs syndrome)  Assessment & Plan:  UNCONTROLLED  - pt with lower extremity aching and restlessness, worse at night  - will start on low dose ropinirole and pt given schedule for home titration until dose found that is effective  -does have some daytime symptoms as well, but uncertain if this is related if this medication works for nighttime symptoms,  may trial daytime dose and see if that helps on as needed basis    Orders:  -     rOPINIRole (REQUIP) 0.25 MG tablet; Take 1 tablet by mouth Every Night. Take 1 hour before bedtime.  Dispense: 90 tablet; Refill: 1    Return in about 6 months (around 2/16/2022) for Annual physical.     Vashti Gr MD  [facility] [place] [facility]  Phone: [phone]  Fax: [phone]

## 2021-08-16 NOTE — ASSESSMENT & PLAN NOTE
CONTROLLED  - BP not in goal range in office today initially but recheck down in normal range   - cont on ACEi and CCB at current doses--> refills sent  - Cont checking BP at home daily, call if values trend outside of goal range

## 2021-08-16 NOTE — ASSESSMENT & PLAN NOTE
UNCONTROLLED  - pt with lower extremity aching and restlessness, worse at night  - will start on low dose ropinirole and pt given schedule for home titration until dose found that is effective  -does have some daytime symptoms as well, but uncertain if this is related if this medication works for nighttime symptoms, may trial daytime dose and see if that helps on as needed basis

## 2021-08-17 LAB
25(OH)D3+25(OH)D2 SERPL-MCNC: 40.3 NG/ML (ref 30–100)
ALBUMIN SERPL-MCNC: 4.6 G/DL (ref 3.5–5.2)
ALBUMIN/GLOB SERPL: 1.9 G/DL
ALP SERPL-CCNC: 68 U/L (ref 39–117)
ALT SERPL-CCNC: 16 U/L (ref 1–33)
APPEARANCE UR: CLEAR
AST SERPL-CCNC: 22 U/L (ref 1–32)
BACTERIA #/AREA URNS HPF: NORMAL /HPF
BASOPHILS # BLD AUTO: 0.07 10*3/MM3 (ref 0–0.2)
BASOPHILS NFR BLD AUTO: 1 % (ref 0–1.5)
BILIRUB SERPL-MCNC: 0.3 MG/DL (ref 0–1.2)
BILIRUB UR QL STRIP: NEGATIVE
BUN SERPL-MCNC: 14 MG/DL (ref 8–23)
BUN/CREAT SERPL: 17.7 (ref 7–25)
CALCIUM SERPL-MCNC: 10.1 MG/DL (ref 8.6–10.5)
CASTS URNS MICRO: NORMAL
CHLORIDE SERPL-SCNC: 101 MMOL/L (ref 98–107)
CHOLEST SERPL-MCNC: 264 MG/DL (ref 0–200)
CO2 SERPL-SCNC: 27.5 MMOL/L (ref 22–29)
COLOR UR: YELLOW
CREAT SERPL-MCNC: 0.79 MG/DL (ref 0.57–1)
EOSINOPHIL # BLD AUTO: 0.31 10*3/MM3 (ref 0–0.4)
EOSINOPHIL NFR BLD AUTO: 4.5 % (ref 0.3–6.2)
EPI CELLS #/AREA URNS HPF: NORMAL /HPF
ERYTHROCYTE [DISTWIDTH] IN BLOOD BY AUTOMATED COUNT: 13.1 % (ref 12.3–15.4)
GLOBULIN SER CALC-MCNC: 2.4 GM/DL
GLUCOSE SERPL-MCNC: 92 MG/DL (ref 65–99)
GLUCOSE UR QL: NEGATIVE
HBA1C MFR BLD: 5.6 % (ref 4.8–5.6)
HCT VFR BLD AUTO: 40.6 % (ref 34–46.6)
HCV AB S/CO SERPL IA: <0.1 S/CO RATIO (ref 0–0.9)
HDLC SERPL-MCNC: 94 MG/DL (ref 40–60)
HGB BLD-MCNC: 13.5 G/DL (ref 12–15.9)
HGB UR QL STRIP: NEGATIVE
IMM GRANULOCYTES # BLD AUTO: 0.01 10*3/MM3 (ref 0–0.05)
IMM GRANULOCYTES NFR BLD AUTO: 0.1 % (ref 0–0.5)
KETONES UR QL STRIP: NEGATIVE
LDLC SERPL CALC-MCNC: 150 MG/DL (ref 0–100)
LEUKOCYTE ESTERASE UR QL STRIP: NEGATIVE
LYMPHOCYTES # BLD AUTO: 2.81 10*3/MM3 (ref 0.7–3.1)
LYMPHOCYTES NFR BLD AUTO: 40.4 % (ref 19.6–45.3)
Lab: NORMAL
MCH RBC QN AUTO: 30.3 PG (ref 26.6–33)
MCHC RBC AUTO-ENTMCNC: 33.3 G/DL (ref 31.5–35.7)
MCV RBC AUTO: 91 FL (ref 79–97)
MONOCYTES # BLD AUTO: 0.54 10*3/MM3 (ref 0.1–0.9)
MONOCYTES NFR BLD AUTO: 7.8 % (ref 5–12)
NEUTROPHILS # BLD AUTO: 3.21 10*3/MM3 (ref 1.7–7)
NEUTROPHILS NFR BLD AUTO: 46.2 % (ref 42.7–76)
NITRITE UR QL STRIP: NEGATIVE
NRBC BLD AUTO-RTO: 0 /100 WBC (ref 0–0.2)
PH UR STRIP: 6.5 [PH] (ref 5–8)
PLATELET # BLD AUTO: 405 10*3/MM3 (ref 140–450)
POTASSIUM SERPL-SCNC: 4 MMOL/L (ref 3.5–5.2)
PROT SERPL-MCNC: 7 G/DL (ref 6–8.5)
PROT UR QL STRIP: NEGATIVE
RBC # BLD AUTO: 4.46 10*6/MM3 (ref 3.77–5.28)
RBC #/AREA URNS HPF: NORMAL /HPF
SODIUM SERPL-SCNC: 139 MMOL/L (ref 136–145)
SP GR UR: 1.01 (ref 1–1.03)
TRIGL SERPL-MCNC: 119 MG/DL (ref 0–150)
TSH SERPL DL<=0.005 MIU/L-ACNC: 0.82 UIU/ML (ref 0.27–4.2)
UROBILINOGEN UR STRIP-MCNC: NORMAL MG/DL
VLDLC SERPL CALC-MCNC: 20 MG/DL (ref 5–40)
WBC # BLD AUTO: 6.95 10*3/MM3 (ref 3.4–10.8)
WBC #/AREA URNS HPF: NORMAL /HPF

## 2021-12-21 ENCOUNTER — OFFICE VISIT (OUTPATIENT)
Dept: INTERNAL MEDICINE | Facility: CLINIC | Age: 61
End: 2021-12-21

## 2021-12-21 VITALS
HEART RATE: 69 BPM | SYSTOLIC BLOOD PRESSURE: 124 MMHG | WEIGHT: 179 LBS | HEIGHT: 63 IN | RESPIRATION RATE: 16 BRPM | OXYGEN SATURATION: 99 % | BODY MASS INDEX: 31.71 KG/M2 | TEMPERATURE: 97.7 F | DIASTOLIC BLOOD PRESSURE: 76 MMHG

## 2021-12-21 DIAGNOSIS — M17.10 ARTHRITIS OF KNEE: ICD-10-CM

## 2021-12-21 DIAGNOSIS — E06.3 HYPOTHYROIDISM DUE TO HASHIMOTO'S THYROIDITIS: ICD-10-CM

## 2021-12-21 DIAGNOSIS — G25.81 RLS (RESTLESS LEGS SYNDROME): Chronic | ICD-10-CM

## 2021-12-21 DIAGNOSIS — I10 BENIGN ESSENTIAL HTN: Primary | ICD-10-CM

## 2021-12-21 DIAGNOSIS — E03.8 HYPOTHYROIDISM DUE TO HASHIMOTO'S THYROIDITIS: ICD-10-CM

## 2021-12-21 DIAGNOSIS — R73.03 PREDIABETES: ICD-10-CM

## 2021-12-21 DIAGNOSIS — R59.0 LYMPHADENOPATHY, SUPRACLAVICULAR: ICD-10-CM

## 2021-12-21 DIAGNOSIS — E78.2 MODERATE MIXED HYPERLIPIDEMIA NOT REQUIRING STATIN THERAPY: Chronic | ICD-10-CM

## 2021-12-21 PROCEDURE — 99214 OFFICE O/P EST MOD 30 MIN: CPT | Performed by: INTERNAL MEDICINE

## 2021-12-21 RX ORDER — BENAZEPRIL HYDROCHLORIDE 20 MG/1
20 TABLET ORAL DAILY
Qty: 90 TABLET | Refills: 1 | Status: SHIPPED | OUTPATIENT
Start: 2021-12-21 | End: 2022-09-16

## 2021-12-21 RX ORDER — AMLODIPINE BESYLATE 10 MG/1
10 TABLET ORAL DAILY
Qty: 90 TABLET | Refills: 1 | Status: SHIPPED | OUTPATIENT
Start: 2021-12-21 | End: 2022-06-28 | Stop reason: SDUPTHER

## 2021-12-21 NOTE — PROGRESS NOTES
"Chief Complaint  Follow-up, Med Refill, Hypertension, and Hypothyroidism    Subjective          Becca Reilly presents to [facility] for follow-up.   Patient has a few things to discuss today. First has concern for skin tag on left shoulder she would like to see if she can get frozen off.   She is also concerned about her thyroid. Was seen at [facility] prior to coming to this practice. Notes that her \"immunoglobulins were off the chart.\" She was sent for a thyroid US and she was told it was normal. Has recent concern that she has some issues swallowing. The difficulties do not occur when swallowing food or drink. She feels like it is positional, as it mostly occurs when she is sitting. Denies any chest pain, palpitations, changes to bowel patterns, weight loss, hair loss, or difficulty sleeping.   She also would like to discuss leg pain. Patient has bilateral knee and leg pain. Has been seen by Ortho in the past. Currently on Meloxicam. She is a part of an exercise boot camp program 3 times weekly which does not cause worsening pain. Pain gets worse as the day goes on and with activity. Describes the pain as achy. On medication for restless legs and has felt like it has helped some.   Does not check BP at home. No chest pain, headaches, or blurry vision. No syncope. Tolerating medications well without side effects.     Objective   Vital Signs:     /76   Pulse 69   Temp 97.7 °F (36.5 °C)   Resp 16   Ht 160 cm (63\")   Wt 81.2 kg (179 lb)   SpO2 99%   BMI 31.71 kg/m²     Physical Exam  Vitals and nursing note reviewed.   Constitutional:       General: She is not in acute distress.     Appearance: Normal appearance.   HENT:      Nose: No congestion or rhinorrhea.      Mouth/Throat:      Mouth: Mucous membranes are moist.      Pharynx: No oropharyngeal exudate or posterior oropharyngeal erythema.   Eyes:      General: No scleral icterus.     " Conjunctiva/sclera: Conjunctivae normal.   Neck:      Comments: Pea-sized supraclavicular lymph node palpated on left side just above clavicle, thyroid normal without palpable nodules or mass  Cardiovascular:      Rate and Rhythm: Normal rate and regular rhythm.      Heart sounds: Normal heart sounds. No murmur heard.      Pulmonary:      Effort: Pulmonary effort is normal. No respiratory distress.      Breath sounds: Normal breath sounds.   Abdominal:      General: Bowel sounds are normal. There is no distension.      Palpations: Abdomen is soft.   Musculoskeletal:         General: No swelling. Normal range of motion.      Right lower leg: No edema.      Left lower leg: No edema.   Lymphadenopathy:      Cervical: No cervical adenopathy.   Neurological:      General: No focal deficit present.      Mental Status: She is alert. Mental status is at baseline.        Result Review :   The following data was reviewed by: Suha Gr MD on 12/21/2021:  CMP    CMP 2/4/21 8/16/21   Glucose 112 (A) 92   BUN 18 14   Creatinine 0.77 0.79   eGFR Non  Am 76 74   eGFR African Am 92 90   Sodium 140 139   Potassium 3.9 4.0   Chloride 102 101   Calcium 9.7 10.1   Total Protein 6.7 7.0   Albumin 4.40 4.60   Globulin 2.3 2.4   Total Bilirubin 0.2 0.3   Alkaline Phosphatase 59 68   AST (SGOT) 18 22   ALT (SGPT) 13 16   (A) Abnormal value       Comments are available for some flowsheets but are not being displayed.           CBC w/diff    CBC w/Diff 8/16/21   WBC 6.95   RBC 4.46   Hemoglobin 13.5   Hematocrit 40.6   MCV 91.0   MCH 30.3   MCHC 33.3   RDW 13.1   Platelets 405   Neutrophil Rel % 46.2   Lymphocyte Rel % 40.4   Monocyte Rel % 7.8   Eosinophil Rel % 4.5   Basophil Rel % 1.0           Lipid Panel    Lipid Panel 2/4/21 8/16/21   Total Cholesterol 254 (A) 264 (A)   Triglycerides 139 119   HDL Cholesterol 84 (A) 94 (A)   VLDL Cholesterol 24 20   LDL Cholesterol  146 (A) 150 (A)   (A) Abnormal value       Comments are  available for some flowsheets but are not being displayed.           TSH    TSH 2/4/21 8/16/21   TSH 0.884 0.823           Most Recent A1C    HGBA1C Most Recent 8/16/21   Hemoglobin A1C 5.60      Comments are available for some flowsheets but are not being displayed.           Assessment and Plan      Diagnoses and all orders for this visit:    1. Benign essential HTN (Primary)  Assessment & Plan:  CONTROLLED  - BP in goal range in office today  - cont on ACEi and CCB at current doses--> refills sent  - Cont checking BP at home daily, call if values trend outside of goal range      Orders:  -     Comprehensive Metabolic Panel  -     benazepril (LOTENSIN) 20 MG tablet; Take 1 tablet by mouth Daily.  Dispense: 90 tablet; Refill: 1  -     amLODIPine (NORVASC) 10 MG tablet; Take 1 tablet by mouth Daily.  Dispense: 90 tablet; Refill: 1    2. Moderate mixed hyperlipidemia not requiring statin therapy  Assessment & Plan:  STABLE  - FLP today for ongoing monitoring, will discuss statin initiation as indicated based on FLP and ASCVD score    Orders:  -     Comprehensive Metabolic Panel  -     Lipid Panel    3. Prediabetes  Assessment & Plan:  STABLE  - A1C check today, will call with results and make treatment recommendations as needed    Orders:  -     Comprehensive Metabolic Panel  -     Hemoglobin A1c    4. Hypothyroidism due to Hashimoto's thyroiditis  Assessment & Plan:  CONTROLLED  - TSH today for ongoing monitoring  - cont on current dose of LTX for now, will adjust dose as indicated based on labs  - Thyroid US ordered today given concern for dysphagia, will follow-up with results    Orders:  -     US Head Neck Soft Tissue; Future  -     TSH; Future    5. RLS (restless legs syndrome)  Assessment & Plan:  UNCONTROLLED  - pt with lower extremity aching and restlessness, worse at night  - continue low dose ropinirole   - Patient to follow-up at Ortho, as chronic OA likely contributing to pain      6. Lymphadenopathy,  supraclavicular  Assessment & Plan:  -Pea-sized left supraclavicular LN on exam today  -No concerning symptoms for systemic disease, patient with recent URI  -Return to clinic in 2 weeks for lab appt  -Will check LN at that time and order additional labs and imaging if LN still palpable       7. Arthritis of knee  Assessment & Plan:  -Patient to return to Ortho for further plan of management (joint replacement vs injections)  -Continue NSAID PRN      Follow Up   Return in about 6 months (around 6/21/2022) for Annual physical.    Patient was given instructions and counseling regarding her condition or for health maintenance advice. Please see specific information pulled into the AVS if appropriate.     Anamika Garcia MD  Internal Medicine and Pediatrics, PGY-3    Patient seen and evaluated with Dr. Garcia. Pertinent portions of history and exam repeated. Agree with assessment and plan as above. 62 yo F with PMHx of HTN, HLD not on meds, hypothyroidism here for follow up. BP well controlled, cont on current meds, check labs today. TSH for ongoing monitoring, will get neck and thyroid US given new dysphagia complaints but no thyromegaly on exam today. Cont ropinirole prn for RLS. Suspect her LE pain is related to her knees, refer back to ortho for repeat discussion about proper timing for knee replacement. Does have supraclavicular node present on L today on exam, did have recent URI which may be responsible, so will have her come back in 2-3 weeks to repeat exam and if still present or enlarged in any way will proceed with further eval given high risk location with labs and CT chest imaging.       Vashti Gr MD  [facility] [place] [facility] [place] [facility]  Phone: [phone]  Fax: [phone]

## 2021-12-21 NOTE — ASSESSMENT & PLAN NOTE
CONTROLLED  - BP in goal range in office today  - cont on ACEi and CCB at current doses--> refills sent  - Cont checking BP at home daily, call if values trend outside of goal range

## 2021-12-21 NOTE — ASSESSMENT & PLAN NOTE
CONTROLLED  - TSH today for ongoing monitoring  - cont on current dose of LTX for now, will adjust dose as indicated based on labs  - Thyroid US ordered today given concern for dysphagia, will follow-up with results

## 2021-12-21 NOTE — ASSESSMENT & PLAN NOTE
-Pea-sized left supraclavicular LN on exam today  -No concerning symptoms for systemic disease, patient with recent URI  -Return to clinic in 2 weeks for lab appt  -Will check LN at that time and order additional labs and imaging if LN still palpable

## 2021-12-21 NOTE — ASSESSMENT & PLAN NOTE
-Patient to return to Ortho for further plan of management (joint replacement vs injections)  -Continue NSAID PRN

## 2021-12-21 NOTE — ASSESSMENT & PLAN NOTE
UNCONTROLLED  - pt with lower extremity aching and restlessness, worse at night  - continue low dose ropinirole   - Patient to follow-up at Ortho, as chronic OA likely contributing to pain

## 2021-12-22 LAB
ALBUMIN SERPL-MCNC: 4.3 G/DL (ref 3.8–4.8)
ALBUMIN/GLOB SERPL: 2 {RATIO} (ref 1.2–2.2)
ALP SERPL-CCNC: 74 IU/L (ref 44–121)
ALT SERPL-CCNC: 12 IU/L (ref 0–32)
AST SERPL-CCNC: 16 IU/L (ref 0–40)
BILIRUB SERPL-MCNC: 0.3 MG/DL (ref 0–1.2)
BUN SERPL-MCNC: 15 MG/DL (ref 8–27)
BUN/CREAT SERPL: 23 (ref 12–28)
CALCIUM SERPL-MCNC: 9.6 MG/DL (ref 8.7–10.3)
CHLORIDE SERPL-SCNC: 102 MMOL/L (ref 96–106)
CHOLEST SERPL-MCNC: 250 MG/DL (ref 100–199)
CO2 SERPL-SCNC: 23 MMOL/L (ref 20–29)
CREAT SERPL-MCNC: 0.66 MG/DL (ref 0.57–1)
GLOBULIN SER CALC-MCNC: 2.2 G/DL (ref 1.5–4.5)
GLUCOSE SERPL-MCNC: 92 MG/DL (ref 65–99)
HBA1C MFR BLD: 6.1 % (ref 4.8–5.6)
HDLC SERPL-MCNC: 86 MG/DL
LDLC SERPL CALC-MCNC: 149 MG/DL (ref 0–99)
POTASSIUM SERPL-SCNC: 4.2 MMOL/L (ref 3.5–5.2)
PROT SERPL-MCNC: 6.5 G/DL (ref 6–8.5)
SODIUM SERPL-SCNC: 140 MMOL/L (ref 134–144)
TRIGL SERPL-MCNC: 88 MG/DL (ref 0–149)
TSH SERPL DL<=0.005 MIU/L-ACNC: 1.06 UIU/ML (ref 0.45–4.5)
VLDLC SERPL CALC-MCNC: 15 MG/DL (ref 5–40)

## 2022-01-11 ENCOUNTER — HOSPITAL ENCOUNTER (OUTPATIENT)
Dept: GENERAL RADIOLOGY | Facility: HOSPITAL | Age: 62
Discharge: HOME | End: 2022-01-11
Admitting: INTERNAL MEDICINE

## 2022-01-11 ENCOUNTER — CLINICAL SUPPORT (OUTPATIENT)
Dept: INTERNAL MEDICINE | Facility: CLINIC | Age: 62
End: 2022-01-11

## 2022-01-11 DIAGNOSIS — R59.0 LYMPHADENOPATHY, SUPRACLAVICULAR: Primary | ICD-10-CM

## 2022-01-11 DIAGNOSIS — L81.9 PIGMENTED SKIN LESION SUSPICIOUS FOR MALIGNANT NEOPLASM: ICD-10-CM

## 2022-01-11 DIAGNOSIS — R59.0 LYMPHADENOPATHY, SUPRACLAVICULAR: ICD-10-CM

## 2022-01-11 PROCEDURE — 71046 X-RAY EXAM CHEST 2 VIEWS: CPT

## 2022-01-12 ENCOUNTER — HOSPITAL ENCOUNTER (OUTPATIENT)
Dept: ULTRASOUND IMAGING | Facility: HOSPITAL | Age: 62
Discharge: HOME | End: 2022-01-12
Admitting: INTERNAL MEDICINE

## 2022-01-12 DIAGNOSIS — E03.8 HYPOTHYROIDISM DUE TO HASHIMOTO'S THYROIDITIS: ICD-10-CM

## 2022-01-12 DIAGNOSIS — E06.3 HYPOTHYROIDISM DUE TO HASHIMOTO'S THYROIDITIS: ICD-10-CM

## 2022-01-12 PROCEDURE — 76536 US EXAM OF HEAD AND NECK: CPT

## 2022-01-13 LAB
BASOPHILS # BLD AUTO: 0.1 X10E3/UL (ref 0–0.2)
BASOPHILS NFR BLD AUTO: 1 %
CRP SERPL-MCNC: 1 MG/L (ref 0–10)
EOSINOPHIL # BLD AUTO: 0.2 X10E3/UL (ref 0–0.4)
EOSINOPHIL NFR BLD AUTO: 4 %
ERYTHROCYTE [DISTWIDTH] IN BLOOD BY AUTOMATED COUNT: 12.6 % (ref 11.7–15.4)
ERYTHROCYTE [SEDIMENTATION RATE] IN BLOOD BY WESTERGREN METHOD: 12 MM/HR (ref 0–40)
HCT VFR BLD AUTO: 41 % (ref 34–46.6)
HGB BLD-MCNC: 13.7 G/DL (ref 11.1–15.9)
IMM GRANULOCYTES # BLD AUTO: 0 X10E3/UL (ref 0–0.1)
IMM GRANULOCYTES NFR BLD AUTO: 0 %
LDH SERPL-CCNC: 180 IU/L (ref 119–226)
LYMPHOCYTES # BLD AUTO: 1.9 X10E3/UL (ref 0.7–3.1)
LYMPHOCYTES NFR BLD AUTO: 40 %
MCH RBC QN AUTO: 29.9 PG (ref 26.6–33)
MCHC RBC AUTO-ENTMCNC: 33.4 G/DL (ref 31.5–35.7)
MCV RBC AUTO: 90 FL (ref 79–97)
MONOCYTES # BLD AUTO: 0.3 X10E3/UL (ref 0.1–0.9)
MONOCYTES NFR BLD AUTO: 7 %
NEUTROPHILS # BLD AUTO: 2.3 X10E3/UL (ref 1.4–7)
NEUTROPHILS NFR BLD AUTO: 48 %
PATH INTERP BLD-IMP: NORMAL
PATH REV BLD -IMP: NORMAL
PATHOLOGIST NAME: NORMAL
PLATELET # BLD AUTO: 356 X10E3/UL (ref 150–450)
RBC # BLD AUTO: 4.58 X10E6/UL (ref 3.77–5.28)
WBC # BLD AUTO: 4.8 X10E3/UL (ref 3.4–10.8)

## 2022-04-07 RX ORDER — LEVOTHYROXINE SODIUM 0.1 MG/1
TABLET ORAL
Qty: 90 TABLET | Refills: 0 | Status: SHIPPED | OUTPATIENT
Start: 2022-04-07 | End: 2022-06-27

## 2022-04-07 NOTE — TELEPHONE ENCOUNTER
Rx Refill Note  Requested Prescriptions     Pending Prescriptions Disp Refills   • levothyroxine (SYNTHROID, LEVOTHROID) 100 MCG tablet [Pharmacy Med Name: Levothyroxine Sodium Oral Tablet 100 MCG] 90 tablet 0     Sig: TAKE 1 TABLET BY MOUTH ONE TIME DAILY      Last office visit with prescribing clinician: 12/21/2021      Next office visit with prescribing clinician: 6/28/2022            Vidya Rivas MA  04/07/22, 07:44 EDT

## 2022-06-27 RX ORDER — LEVOTHYROXINE SODIUM 0.1 MG/1
TABLET ORAL
Qty: 30 TABLET | Refills: 0 | Status: SHIPPED | OUTPATIENT
Start: 2022-06-27 | End: 2022-07-25

## 2022-06-27 NOTE — TELEPHONE ENCOUNTER
Rx Refill Note  Requested Prescriptions     Pending Prescriptions Disp Refills   • levothyroxine (SYNTHROID, LEVOTHROID) 100 MCG tablet [Pharmacy Med Name: Levothyroxine Sodium Oral Tablet 100 MCG] 90 tablet 0     Sig: TAKE 1 TABLET BY MOUTH ONE TIME DAILY      Last office visit with prescribing clinician: Visit date not found      Next office visit with prescribing clinician: Visit date not found            Gricel Sage MA  06/27/22, 10:01 EDT

## 2022-06-28 ENCOUNTER — OFFICE VISIT (OUTPATIENT)
Dept: INTERNAL MEDICINE | Facility: CLINIC | Age: 62
End: 2022-06-28

## 2022-06-28 VITALS
HEART RATE: 86 BPM | DIASTOLIC BLOOD PRESSURE: 82 MMHG | TEMPERATURE: 96.6 F | HEIGHT: 63 IN | SYSTOLIC BLOOD PRESSURE: 116 MMHG | WEIGHT: 181 LBS | RESPIRATION RATE: 16 BRPM | BODY MASS INDEX: 32.07 KG/M2 | OXYGEN SATURATION: 97 %

## 2022-06-28 DIAGNOSIS — E78.2 MODERATE MIXED HYPERLIPIDEMIA NOT REQUIRING STATIN THERAPY: Chronic | ICD-10-CM

## 2022-06-28 DIAGNOSIS — E03.8 HYPOTHYROIDISM DUE TO HASHIMOTO'S THYROIDITIS: ICD-10-CM

## 2022-06-28 DIAGNOSIS — I10 BENIGN ESSENTIAL HTN: Primary | ICD-10-CM

## 2022-06-28 DIAGNOSIS — G25.81 RLS (RESTLESS LEGS SYNDROME): Chronic | ICD-10-CM

## 2022-06-28 DIAGNOSIS — E06.3 HYPOTHYROIDISM DUE TO HASHIMOTO'S THYROIDITIS: ICD-10-CM

## 2022-06-28 DIAGNOSIS — R73.03 PREDIABETES: ICD-10-CM

## 2022-06-28 PROCEDURE — 99214 OFFICE O/P EST MOD 30 MIN: CPT | Performed by: INTERNAL MEDICINE

## 2022-06-28 RX ORDER — PHENTERMINE HYDROCHLORIDE 37.5 MG/1
18.75 TABLET ORAL
COMMUNITY
Start: 2022-07-02

## 2022-06-28 RX ORDER — AMLODIPINE BESYLATE 5 MG/1
5 TABLET ORAL DAILY
Qty: 90 TABLET | Refills: 1 | Status: SHIPPED | OUTPATIENT
Start: 2022-06-28

## 2022-06-28 RX ORDER — TOPIRAMATE 25 MG/1
TABLET ORAL
COMMUNITY
Start: 2022-06-27

## 2022-06-28 NOTE — PROGRESS NOTES
"Chief Complaint  Follow-up, Hypertension, and Hypothyroidism    Subjective          Becca Reilly presents to [facility] for follow up and med refills. Pt taking all medications daily as prescribed with good reported compliance. No issues or side effects with meds.   She is working with weight loss specialist at [place] and is currently taking phentermine and topamax to help with weight loss.   Checks BP routinely at home and always in 110s and does occasionally have dizziness.       Objective   Vital Signs:     /82   Pulse 86   Temp 96.6 °F (35.9 °C)   Resp 16   Ht 160 cm (63\")   Wt 82.1 kg (181 lb)   SpO2 97%   BMI 32.06 kg/m²     Physical Exam  Vitals and nursing note reviewed.   Constitutional:       General: She is not in acute distress.     Appearance: Normal appearance.   Cardiovascular:      Rate and Rhythm: Normal rate and regular rhythm.      Pulses: Normal pulses.      Heart sounds: Normal heart sounds. No murmur heard.  Pulmonary:      Effort: Pulmonary effort is normal. No respiratory distress.      Breath sounds: Normal breath sounds.   Abdominal:      General: Abdomen is flat. Bowel sounds are normal.      Palpations: Abdomen is soft.      Tenderness: There is no abdominal tenderness.   Musculoskeletal:      Right lower leg: No edema.      Left lower leg: No edema.   Neurological:      Mental Status: She is alert and oriented to person, place, and time. Mental status is at baseline.   Psychiatric:         Mood and Affect: Mood normal.         Behavior: Behavior normal.          Result Review :   The following data was reviewed by: Suha Gr MD on 06/28/2022:  CMP    CMP 8/16/21 12/21/21 4/8/22   Glucose 92 92    Glucose   92   BUN 14 15 19   Creatinine 0.79 0.66 0.64   eGFR Non  Am 74 95    eGFR African Am 90 109    Sodium 139 140 144   Potassium 4.0 4.2 4.5   Chloride 101 102 100   Calcium 10.1 9.6 9.8   Total Protein 7.0 " 6.5    Albumin 4.60 4.3 4.6   Globulin 2.4 2.2    Total Bilirubin 0.3 0.3 0.3   Alkaline Phosphatase 68 74 71   AST (SGOT) 22 16 25   ALT (SGPT) 16 12 22      Comments are available for some flowsheets but are not being displayed.           CBC w/diff    CBC w/Diff 8/16/21 1/11/22 1/11/22 4/8/22     0923 0923    WBC 6.95 Comment 4.8 5.76   RBC 4.46 Comment 4.58 4.66   Hemoglobin 13.5 13.7  14.0   Hematocrit 40.6 41.0  43.6   MCV 91.0 90  93.6   MCH 30.3 29.9  30.0   MCHC 33.3 33.4  32.1   RDW 13.1 12.6  13.9   Platelets 405 356  410   Neutrophil Rel % 46.2 48  47.0   Immature Granulocyte Rel %    0.2   Lymphocyte Rel % 40.4 40  38.7   Monocyte Rel % 7.8 7  8.2   Eosinophil Rel % 4.5 4  4.9   Basophil Rel % 1.0 1  1.0      Comments are available for some flowsheets but are not being displayed.           Lipid Panel    Lipid Panel 8/16/21 12/21/21   Total Cholesterol 264 (A) 250 (A)   Triglycerides 119 88   HDL Cholesterol 94 (A) 86   VLDL Cholesterol 20 15   LDL Cholesterol  150 (A) 149 (A)   (A) Abnormal value       Comments are available for some flowsheets but are not being displayed.           TSH    TSH 8/16/21 12/21/21 4/8/22   TSH 0.823 1.060 1.400           Most Recent A1C    HGBA1C Most Recent 4/8/22   Hemoglobin A1C 5.9 (A)   (A) Abnormal value               Assessment and Plan      Diagnoses and all orders for this visit:    1. Benign essential HTN (Primary)  Assessment & Plan:  CONTROLLED  - BP in goal range in office today but does have some hypotensive issues with dizziness  - cont ACEi at full dose  - will decrease CCB dose to 5 mg daily  - Cont checking BP at home daily, if SBP still running <120 after 1-2 weeks can go ahead and stop CCB and cont to monitor with goal of 120-low 130s/70s      Orders:  -     Comprehensive Metabolic Panel  -     amLODIPine (NORVASC) 5 MG tablet; Take 1 tablet by mouth Daily.  Dispense: 90 tablet; Refill: 1    2. Hypothyroidism due to Hashimoto's thyroiditis  Assessment &  Plan:  CONTROLLED  - TSH today for ongoing monitoring  - cont on current dose of LTX for now, will adjust dose as indicated based on labs  - thyroid US done 12.21 and negative    Orders:  -     TSH    3. Moderate mixed hyperlipidemia not requiring statin therapy  Assessment & Plan:  STABLE  - FLP today for ongoing monitoring, will discuss statin initiation as indicated based on FLP and ASCVD score  - cont with good diet and lifestyle changes including increased exercise, low chol and low fat diet, and increased fiber      Orders:  -     Comprehensive Metabolic Panel  -     Lipid Panel    4. Prediabetes  -     Comprehensive Metabolic Panel  -     Hemoglobin A1c    5. RLS (restless legs syndrome)  Assessment & Plan:  CONTROLLED  - pt has Rx for ropinirole but rarely uses  - cont working on good exercise and MVI with [employer]      Follow Up   Return in about 4 months (around 10/28/2022) for Annual physical.    Patient was given instructions and counseling regarding her condition or for health maintenance advice. Please see specific information pulled into the AVS if appropriate.     Vashti Gr MD  [facility] [place] [facility]  Phone: [phone]  Fax: [phone]

## 2022-06-28 NOTE — ASSESSMENT & PLAN NOTE
CONTROLLED  - TSH today for ongoing monitoring  - cont on current dose of LTX for now, will adjust dose as indicated based on labs  - thyroid US done 12.21 and negative

## 2022-06-28 NOTE — ASSESSMENT & PLAN NOTE
CONTROLLED  - pt has Rx for ropinirole but rarely uses  - cont working on good exercise and MVI with Iron

## 2022-06-28 NOTE — ASSESSMENT & PLAN NOTE
CONTROLLED  - BP in goal range in office today but does have some hypotensive issues with dizziness  - cont ACEi at full dose  - will decrease CCB dose to 5 mg daily  - Cont checking BP at home daily, if SBP still running <120 after 1-2 weeks can go ahead and stop CCB and cont to monitor with goal of 120-low 130s/70s

## 2022-06-28 NOTE — ASSESSMENT & PLAN NOTE
STABLE  - FLP today for ongoing monitoring, will discuss statin initiation as indicated based on FLP and ASCVD score  - cont with good diet and lifestyle changes including increased exercise, low chol and low fat diet, and increased fiber

## 2022-06-29 LAB
ALBUMIN SERPL-MCNC: 4.6 G/DL (ref 3.8–4.8)
ALBUMIN/GLOB SERPL: 2.1 {RATIO} (ref 1.2–2.2)
ALP SERPL-CCNC: 75 IU/L (ref 44–121)
ALT SERPL-CCNC: 12 IU/L (ref 0–32)
AST SERPL-CCNC: 15 IU/L (ref 0–40)
BILIRUB SERPL-MCNC: 0.4 MG/DL (ref 0–1.2)
BUN SERPL-MCNC: 14 MG/DL (ref 8–27)
BUN/CREAT SERPL: 18 (ref 12–28)
CALCIUM SERPL-MCNC: 9.8 MG/DL (ref 8.7–10.3)
CHLORIDE SERPL-SCNC: 102 MMOL/L (ref 96–106)
CHOLEST SERPL-MCNC: 257 MG/DL (ref 100–199)
CO2 SERPL-SCNC: 21 MMOL/L (ref 20–29)
CREAT SERPL-MCNC: 0.8 MG/DL (ref 0.57–1)
EGFRCR SERPLBLD CKD-EPI 2021: 83 ML/MIN/1.73
GLOBULIN SER CALC-MCNC: 2.2 G/DL (ref 1.5–4.5)
GLUCOSE SERPL-MCNC: 98 MG/DL (ref 65–99)
HBA1C MFR BLD: 5.9 % (ref 4.8–5.6)
HDLC SERPL-MCNC: 102 MG/DL
LDLC SERPL CALC-MCNC: 142 MG/DL (ref 0–99)
POTASSIUM SERPL-SCNC: 4.5 MMOL/L (ref 3.5–5.2)
PROT SERPL-MCNC: 6.8 G/DL (ref 6–8.5)
SODIUM SERPL-SCNC: 138 MMOL/L (ref 134–144)
TRIGL SERPL-MCNC: 80 MG/DL (ref 0–149)
TSH SERPL DL<=0.005 MIU/L-ACNC: 1.7 UIU/ML (ref 0.45–4.5)
VLDLC SERPL CALC-MCNC: 13 MG/DL (ref 5–40)

## 2022-07-21 ENCOUNTER — TELEPHONE (OUTPATIENT)
Dept: INTERNAL MEDICINE | Facility: CLINIC | Age: 62
End: 2022-07-21

## 2022-07-21 NOTE — TELEPHONE ENCOUNTER
Caller: Becca Reilly    Relationship: Self    Best call back number: [phone]    Who are you requesting to speak with (clinical staff, provider,  specific staff member): DR TERRY STEWART OR MA    What was the call regarding: PATIENT STATES THAT SHE HAS HAD A FEELING OF SOMETHING STUCK IN HER THROAT FOR A WHILE. SHE HAD AN ULTRASOUND OF HER THYROID EARLIER IN THE YEAR, BUT IT DID NOT SHOW ANY SIGNS OR  ABNORMALITIES. SHE IS STILL DEALING WITH THE FEELING IN HER THROAT. SHE HAS AN APPOINTMENT SCHEDULED WITH DR STEWART ON 8/5/22, BUT REQUESTS A CALL BACK TO DISCUSS CONCERNS SOONER    Do you require a callback: YES

## 2022-07-25 RX ORDER — LEVOTHYROXINE SODIUM 0.1 MG/1
TABLET ORAL
Qty: 30 TABLET | Refills: 0 | Status: SHIPPED | OUTPATIENT
Start: 2022-07-25 | End: 2022-08-22

## 2022-07-25 NOTE — TELEPHONE ENCOUNTER
Rx Refill Note  Requested Prescriptions     Pending Prescriptions Disp Refills   • levothyroxine (SYNTHROID, LEVOTHROID) 100 MCG tablet [Pharmacy Med Name: Levothyroxine Sodium Oral Tablet 100 MCG] 30 tablet 0     Sig: TAKE 1 TABLET BY MOUTH ONE TIME DAILY      Last office visit with prescribing clinician: 6/28/2022      Next office visit with prescribing clinician: 7/28/2022            Gricel Sage MA  07/25/22, 14:52 EDT

## 2022-07-28 ENCOUNTER — TELEMEDICINE (OUTPATIENT)
Dept: INTERNAL MEDICINE | Facility: CLINIC | Age: 62
End: 2022-07-28

## 2022-07-28 DIAGNOSIS — R09.89 GLOBUS SENSATION: Primary | ICD-10-CM

## 2022-07-28 DIAGNOSIS — R59.0 LYMPHADENOPATHY, SUPRACLAVICULAR: ICD-10-CM

## 2022-07-28 PROCEDURE — 99442 PR PHYS/QHP TELEPHONE EVALUATION 11-20 MIN: CPT | Performed by: INTERNAL MEDICINE

## 2022-07-28 NOTE — PROGRESS NOTES
Chief Complaint  Something stuck in her throat    You have chosen to receive care through a telephone visit. Do you consent to use a telephone visit for your medical care today? Yes    Subjective          Becca Reilly presents to [facility] for globus sensation. Has had this issue ongoing for several months now and seems to be more progressive lately. Feels like something is there in the middle of her throat and now feels all the time. No issues with swallowing, food and liquid both go down ok. Feels like she will occasionally have issues getting air in because whatever this is seems to be occluding her airway and will have to position her head and neck differently to get a full breath.       Result Review :   The following data was reviewed by: Suha Gr MD on 07/28/2022:    Data reviewed: Radiologic studies neck US, CXR            Assessment and Plan      Diagnoses and all orders for this visit:    1. Globus sensation (Primary)  2. Lymphadenopathy, supraclavicular   - will get CT of soft tissue neck and chest to look for etiology   - does not impair swallowing so does not seem an EGD would be helpful at this time   - US was negative other than supraclavicular nodes 1/22 with neg CXR to follow   - will follow up after imaging and determine next step plan in evaluation and treatment as indicated    Orders:  -     CT Soft Tissue Neck Without Contrast; Future  -     CT Chest Without Contrast; Future    I spent 13 minutes caring for Becca on this date of service. This time includes time spent by me in the following activities:direct teleconference    Follow Up   Return for follow up after imaging.    Patient was given instructions and counseling regarding her condition or for health maintenance advice. Please see specific information pulled into the AVS if appropriate.     Vashti Gr MD  [facility]  Phone: [phone]  Fax: [phone]

## 2022-08-01 ENCOUNTER — TELEPHONE (OUTPATIENT)
Dept: INTERNAL MEDICINE | Facility: CLINIC | Age: 62
End: 2022-08-01

## 2022-08-01 NOTE — TELEPHONE ENCOUNTER
LUIS MIGUEL CALLED BACK TO SEE IF SHE IS ABLE TO GET THE CHART NOTES FOR PATIENT SENT OVER PRIOR TO 4 PM TODAY.  LUIS MIGUEL IS NEEDING TO GET THE PRE-CERT DONE TODAY PRIOR TO PATIENTS EARLY MORNING APPT.    ATTEMPTED A WARM TRANSFER, UNSUCCESSFUL.

## 2022-08-01 NOTE — TELEPHONE ENCOUNTER
Caller: [facility]    Relationship: Other    Best call back number: [phone]    Who are you requesting to speak with (clinical staff, provider,  specific staff member): CLINICAL    What was the call regarding: SAILNA WITH [facility] IS REQUESTING OFFICE NOTES BEFORE THE PATIENT IS SEEN THERE TOMORROW.      FAX NUMBER FOR [facility]: [phone]

## 2022-08-04 ENCOUNTER — TELEPHONE (OUTPATIENT)
Dept: INTERNAL MEDICINE | Facility: CLINIC | Age: 62
End: 2022-08-04

## 2022-08-04 NOTE — TELEPHONE ENCOUNTER
Caller: Becca Reilly    Relationship: Self    Best call back number: [phone]    What is the best time to reach you: ANYTIME    Who are you requesting to speak with (clinical staff, provider,  specific staff member):CLINICAL    What was the call regarding: WANTS TO KNOW WHEN HER IMAGING WILL BE BACK    Do you require a callback: YES

## 2022-08-05 NOTE — TELEPHONE ENCOUNTER
I have not seen these imaging reports, when did she have the scan done and where was it done so we can try to juancarlos the results down? My guess is it is buried in the faxes soemwhere

## 2022-08-09 NOTE — TELEPHONE ENCOUNTER
Patient spoke to someone at the office about these results  She has been communicating directly with dr pelaez

## 2022-08-22 RX ORDER — LEVOTHYROXINE SODIUM 0.1 MG/1
TABLET ORAL
Qty: 90 TABLET | Refills: 1 | Status: SHIPPED | OUTPATIENT
Start: 2022-08-22 | End: 2023-02-26

## 2022-08-28 DIAGNOSIS — E03.8 HYPOTHYROIDISM DUE TO HASHIMOTO'S THYROIDITIS: ICD-10-CM

## 2022-08-28 DIAGNOSIS — R09.89 GLOBUS SENSATION: ICD-10-CM

## 2022-08-28 DIAGNOSIS — R59.0 LYMPHADENOPATHY, SUPRACLAVICULAR: Primary | ICD-10-CM

## 2022-08-28 DIAGNOSIS — E06.3 HYPOTHYROIDISM DUE TO HASHIMOTO'S THYROIDITIS: ICD-10-CM

## 2022-09-16 DIAGNOSIS — I10 BENIGN ESSENTIAL HTN: ICD-10-CM

## 2022-09-16 RX ORDER — BENAZEPRIL HYDROCHLORIDE 20 MG/1
TABLET ORAL
Qty: 90 TABLET | Refills: 1 | Status: SHIPPED | OUTPATIENT
Start: 2022-09-16 | End: 2023-02-28

## 2022-09-16 NOTE — TELEPHONE ENCOUNTER
Rx Refill Note  Requested Prescriptions     Pending Prescriptions Disp Refills   • benazepril (LOTENSIN) 20 MG tablet [Pharmacy Med Name: Benazepril HCl Oral Tablet 20 MG] 90 tablet 0     Sig: TAKE 1 TABLET BY MOUTH ONE TIME DAILY      Last office visit with prescribing clinician: 6/28/2022      Next office visit with prescribing clinician: 11/29/2022            Gricel Sage MA  09/16/22, 09:48 EDT

## 2022-11-29 ENCOUNTER — OFFICE VISIT (OUTPATIENT)
Dept: INTERNAL MEDICINE | Facility: CLINIC | Age: 62
End: 2022-11-29

## 2022-11-29 VITALS
RESPIRATION RATE: 16 BRPM | WEIGHT: 159 LBS | DIASTOLIC BLOOD PRESSURE: 76 MMHG | OXYGEN SATURATION: 99 % | HEIGHT: 63 IN | HEART RATE: 79 BPM | SYSTOLIC BLOOD PRESSURE: 120 MMHG | BODY MASS INDEX: 28.17 KG/M2 | TEMPERATURE: 97.9 F

## 2022-11-29 DIAGNOSIS — Z00.00 ROUTINE GENERAL MEDICAL EXAMINATION AT A HEALTH CARE FACILITY: Primary | ICD-10-CM

## 2022-11-29 DIAGNOSIS — I10 BENIGN ESSENTIAL HTN: ICD-10-CM

## 2022-11-29 DIAGNOSIS — E06.3 HYPOTHYROIDISM DUE TO HASHIMOTO'S THYROIDITIS: ICD-10-CM

## 2022-11-29 DIAGNOSIS — G25.81 RLS (RESTLESS LEGS SYNDROME): Chronic | ICD-10-CM

## 2022-11-29 DIAGNOSIS — R31.9 HEMATURIA, UNSPECIFIED TYPE: ICD-10-CM

## 2022-11-29 DIAGNOSIS — E78.2 MODERATE MIXED HYPERLIPIDEMIA NOT REQUIRING STATIN THERAPY: Chronic | ICD-10-CM

## 2022-11-29 DIAGNOSIS — R73.03 PREDIABETES: ICD-10-CM

## 2022-11-29 DIAGNOSIS — E03.8 HYPOTHYROIDISM DUE TO HASHIMOTO'S THYROIDITIS: ICD-10-CM

## 2022-11-29 LAB
BILIRUB BLD-MCNC: NEGATIVE MG/DL
CLARITY, POC: CLEAR
COLOR UR: YELLOW
EXPIRATION DATE: ABNORMAL
GLUCOSE UR STRIP-MCNC: NEGATIVE MG/DL
KETONES UR QL: NEGATIVE
LEUKOCYTE EST, POC: NEGATIVE
Lab: ABNORMAL
NITRITE UR-MCNC: NEGATIVE MG/ML
PH UR: 6 [PH] (ref 5–8)
PROT UR STRIP-MCNC: NEGATIVE MG/DL
RBC # UR STRIP: ABNORMAL /UL
SP GR UR: 1.01 (ref 1–1.03)
UROBILINOGEN UR QL: NORMAL

## 2022-11-29 PROCEDURE — 99396 PREV VISIT EST AGE 40-64: CPT | Performed by: INTERNAL MEDICINE

## 2022-11-29 PROCEDURE — 81003 URINALYSIS AUTO W/O SCOPE: CPT | Performed by: INTERNAL MEDICINE

## 2022-11-29 NOTE — PROGRESS NOTES
Chief Complaint   Patient presents with   • Annual Exam       Subjective   History of Present Illness    Becca Reilly is a 62 y.o. female here for annual wellness. Pt does not have acute issues to discuss today. States overall things are going well. Taking all meds as prescribed without any issues.     The following portions of the patient's history were reviewed and updated as appropriate: allergies, current medications, past family history, past medical history, past social history, past surgical history, and problem list.    Patient checks BP at home and is typically within normal range. Has taken her amlodipine today and benazepril. She will hold amlodipine if her SBP is 110 or less. She holds it appx 2-3 times weekly. Has taken any meds this AM.   She is still on phentermine for weight loss. No headaches, blurry visions, or chest pain. No palpitations. Has lost 22 pounds since June.   Has been seeing ENT for swollen glands and abnormal sensation in throat when swallowing. She sees [facility]. He did start her on omeprazole for reflux and nasal spray. This has not seemed to help. No choking episodes of difficulty swallowing. The sensation has been present for over a year.     Patient Care Team:  Suha Gr MD as PCP - General (Internal Medicine & Pediatrics)  Titi Martel MD as Consulting Physician (Gastroenterology)  Opal Lopez MD as Consulting Physician (Obstetrics and Gynecology)  Daniel Rae MD as Consulting Physician (Orthopedic Surgery)    Review of Systems   Constitutional: Negative for appetite change, chills and fever.   HENT: Negative for congestion, trouble swallowing and voice change.    Respiratory: Negative for cough and shortness of breath.    Cardiovascular: Negative for chest pain and palpitations.   Gastrointestinal: Negative for abdominal pain, constipation and diarrhea.   Genitourinary: Negative for dysuria and hematuria.   Musculoskeletal: Negative for  "back pain and joint swelling.   Skin: Negative for color change and rash.   Neurological: Negative for dizziness, weakness and headache.   Psychiatric/Behavioral: Negative for depressed mood. The patient is not nervous/anxious.        Body mass index is 28.17 kg/m².   Vitals:    11/29/22 0926   BP: 120/76   Pulse: 79   Resp: 16   Temp: 97.9 °F (36.6 °C)   SpO2: 99%   Weight: 72.1 kg (159 lb)   Height: 160 cm (63\")      Objective   Physical Exam  Vitals reviewed.   Constitutional:       General: She is not in acute distress.     Appearance: Normal appearance.   HENT:      Head: Normocephalic and atraumatic.      Nose: No congestion or rhinorrhea.      Mouth/Throat:      Pharynx: Oropharynx is clear. No oropharyngeal exudate or posterior oropharyngeal erythema.   Eyes:      General: No scleral icterus.        Right eye: No discharge.         Left eye: No discharge.      Conjunctiva/sclera: Conjunctivae normal.   Cardiovascular:      Rate and Rhythm: Normal rate and regular rhythm.      Pulses: Normal pulses.      Heart sounds: No murmur heard.  Pulmonary:      Effort: Pulmonary effort is normal. No respiratory distress.      Breath sounds: Normal breath sounds. No wheezing or rhonchi.   Abdominal:      General: Abdomen is flat. Bowel sounds are normal. There is no distension.      Palpations: Abdomen is soft.      Tenderness: There is no abdominal tenderness.   Musculoskeletal:         General: No swelling or deformity.   Lymphadenopathy:      Cervical: No cervical adenopathy.   Skin:     General: Skin is warm and dry.      Findings: No rash.   Neurological:      General: No focal deficit present.      Mental Status: She is alert and oriented to person, place, and time. Mental status is at baseline.   Psychiatric:         Mood and Affect: Mood normal.         Behavior: Behavior normal.        Brief Urine Lab Results  (Last result in the past 365 days)      Color   Clarity   Blood   Leuk Est   Nitrite   Protein   CREAT "   Urine HCG        11/29/22 1022 Yellow   Clear   Trace   Negative   Negative   Negative                 Current Outpatient Medications:   •  amLODIPine (NORVASC) 5 MG tablet, Take 1 tablet by mouth Daily., Disp: 90 tablet, Rfl: 1  •  aspirin 81 MG tablet, Take  by mouth., Disp: , Rfl:   •  benazepril (LOTENSIN) 20 MG tablet, TAKE 1 TABLET BY MOUTH ONE TIME DAILY, Disp: 90 tablet, Rfl: 1  •  carbamide peroxide (DEBROX) 6.5 % otic solution, Administer 5 drops into ear(s) as directed by provider., Disp: , Rfl:   •  levothyroxine (SYNTHROID, LEVOTHROID) 100 MCG tablet, TAKE 1 TABLET BY MOUTH ONE TIME DAILY, Disp: 90 tablet, Rfl: 1  •  meloxicam (MOBIC) 7.5 MG tablet, , Disp: , Rfl:   •  phentermine (ADIPEX-P) 37.5 MG tablet, Take 18.75 mg by mouth., Disp: , Rfl:   •  rOPINIRole (REQUIP) 0.25 MG tablet, Take 1 tablet by mouth Every Night. Take 1 hour before bedtime., Disp: 90 tablet, Rfl: 1  •  topiramate (TOPAMAX) 25 MG tablet, , Disp: , Rfl:      Lab Results   Component Value Date    HGBA1C 5.9 (H) 06/28/2022    TSH 1.700 06/28/2022    CVTU47XD 40.3 08/16/2021      Health Maintenance   Topic Date Due   • LIPID PANEL  06/28/2023   • TDAP/TD VACCINES (2 - Td or Tdap) 08/26/2023   • PAP SMEAR  08/10/2024   • MAMMOGRAM  08/16/2024   • INFLUENZA VACCINE  Completed   • ZOSTER VACCINE  Completed      Immunization History   Administered Date(s) Administered   • COVID-19 (PFIZER) PURPLE CAP 03/24/2021, 04/14/2021, 12/01/2021   • Flu Vaccine Quad PF >36MO 10/04/2017, 09/17/2020   • FluLaval/Fluzone >6mos 10/10/2018, 11/22/2019, 10/19/2021, 09/26/2022   • Fluzone Quad >6mos (Multi-dose) 10/18/2018, 10/19/2021   • Influenza, Unspecified 11/22/2019, 10/19/2021, 09/26/2022   • Shingrix 01/01/2019, 11/22/2019, 01/22/2020   • Tdap 08/26/2013     Assessment & Plan     Annual Wellness  - Labs ordered today including CBC, CMP, Fasting lipid panel, HgbA1C and TSH. Will call with results once available and make follow up plan and med  changes as appropriate.   - Cont current medications for chronic medical issues, refills sent as needed today.   - EKG not indicated  - PAP smear up to date and managed by gynecology. Last August 2022 and normal  - Mammo up to date and managed by gynecology. Last August 2022 and normal  - Cscope done in 2017 and normal. Repeat in 5-10 years due to family history of UC. Referral has already been placed.   - DEXA not yet indicated, due at 64 yo  - Lung CA screening not indicated  - Immunizations: Flu shot UTD. COVID series UTD, bivalent booster recommended. TDaP UTD x 1 more year. Shingrix UTD. Pneumococcal coverage indicated at 64 yo.    - Depression screen reviewed with patient and negative.  - Advised behavioral modifications including dietary changes and increased exercise with goal of healthy weight and lifestyle.    Diagnoses and all orders for this visit:    1. Routine general medical examination at a health care facility (Primary)  -     CBC & Differential  -     Comprehensive Metabolic Panel  -     Hemoglobin A1c  -     Lipid Panel  -     TSH  -     POC Urinalysis Dipstick, Automated    2. RLS (restless legs syndrome)  Assessment & Plan:  CONTROLLED  - pt has Rx for ropinirole but rarely uses  - cont working on good exercise and MVI with Iron      3. Prediabetes    4. Hypothyroidism due to Hashimoto's thyroiditis  Assessment & Plan:  CONTROLLED  - TSH today for ongoing monitoring  - cont on current dose of LTX for now, will adjust dose as indicated based on labs      5. Benign essential HTN  Assessment & Plan:  CONTROLLED  - BP in goal range in office today but has been holding CCB multiple times a week  - cont ACEi at full dose  - stop Amlodipine today   - Cont checking BP at home daily, if BPs consistently elevated above range, call office (will restart CCB)      6. Moderate mixed hyperlipidemia not requiring statin therapy  Assessment & Plan:  STABLE  - FLP today for ongoing monitoring, will discuss statin  initiation as indicated based on FLP and ASCVD score  - cont with good diet and lifestyle changes including increased exercise, low chol and low fat diet, and increased fiber             Return in about 6 months (around 5/29/2023) for follow up with labs.     Anamika Garcia MD  Internal Medicine and Pediatrics, PGY-4    Patient seen and evaluated with Dr. Garcia. Pertinent portions of history and exam repeated. Agree with assessment and plan as above. 63 yo F with hypothyroidism and HTN here for CPE. Overall doing well, no issue iwht current meds, BP in goal range. Standard screening labs today. PAP and Mammo UTD and managed by GYN. CRC screening UTD> DEXA due at 64 yo. Vaccines UTD other than new bivalent COVID booster. Refills sent on meds as needed.     Vashti Gr MD  [facility] [place] [facility]  Phone: [phone]  Fax: [phone]

## 2022-11-29 NOTE — ASSESSMENT & PLAN NOTE
CONTROLLED  - TSH today for ongoing monitoring  - cont on current dose of LTX for now, will adjust dose as indicated based on labs

## 2022-11-29 NOTE — ASSESSMENT & PLAN NOTE
CONTROLLED  - BP in goal range in office today but has been holding CCB multiple times a week  - cont ACEi at full dose  - stop Amlodipine today   - Cont checking BP at home daily, if BPs consistently elevated above range, call office (will restart CCB)

## 2022-11-30 LAB
ALBUMIN SERPL-MCNC: 4.2 G/DL (ref 3.5–5.2)
ALBUMIN/GLOB SERPL: 1.5 G/DL
ALP SERPL-CCNC: 74 U/L (ref 39–117)
ALT SERPL-CCNC: 13 U/L (ref 1–33)
APPEARANCE UR: CLEAR
AST SERPL-CCNC: 18 U/L (ref 1–32)
BACTERIA #/AREA URNS HPF: NORMAL /HPF
BASOPHILS # BLD AUTO: 0.05 10*3/MM3 (ref 0–0.2)
BASOPHILS NFR BLD AUTO: 0.8 % (ref 0–1.5)
BILIRUB SERPL-MCNC: 0.3 MG/DL (ref 0–1.2)
BILIRUB UR QL STRIP: NEGATIVE
BUN SERPL-MCNC: 12 MG/DL (ref 8–23)
BUN/CREAT SERPL: 17.4 (ref 7–25)
CALCIUM SERPL-MCNC: 9.8 MG/DL (ref 8.6–10.5)
CASTS URNS MICRO: NORMAL
CHLORIDE SERPL-SCNC: 103 MMOL/L (ref 98–107)
CHOLEST SERPL-MCNC: 270 MG/DL (ref 0–200)
CO2 SERPL-SCNC: 26.1 MMOL/L (ref 22–29)
COLOR UR: YELLOW
CREAT SERPL-MCNC: 0.69 MG/DL (ref 0.57–1)
EGFRCR SERPLBLD CKD-EPI 2021: 97.7 ML/MIN/1.73
EOSINOPHIL # BLD AUTO: 0.28 10*3/MM3 (ref 0–0.4)
EOSINOPHIL NFR BLD AUTO: 4.4 % (ref 0.3–6.2)
EPI CELLS #/AREA URNS HPF: NORMAL /HPF
ERYTHROCYTE [DISTWIDTH] IN BLOOD BY AUTOMATED COUNT: 13.1 % (ref 12.3–15.4)
GLOBULIN SER CALC-MCNC: 2.8 GM/DL
GLUCOSE SERPL-MCNC: 86 MG/DL (ref 65–99)
GLUCOSE UR QL STRIP: NEGATIVE
HBA1C MFR BLD: 6 % (ref 4.8–5.6)
HCT VFR BLD AUTO: 41.9 % (ref 34–46.6)
HDLC SERPL-MCNC: 100 MG/DL (ref 40–60)
HGB BLD-MCNC: 13.9 G/DL (ref 12–15.9)
HGB UR QL STRIP: NEGATIVE
IMM GRANULOCYTES # BLD AUTO: 0.01 10*3/MM3 (ref 0–0.05)
IMM GRANULOCYTES NFR BLD AUTO: 0.2 % (ref 0–0.5)
KETONES UR QL STRIP: NEGATIVE
LDLC SERPL CALC-MCNC: 152 MG/DL (ref 0–100)
LEUKOCYTE ESTERASE UR QL STRIP: NEGATIVE
LYMPHOCYTES # BLD AUTO: 2.6 10*3/MM3 (ref 0.7–3.1)
LYMPHOCYTES NFR BLD AUTO: 41 % (ref 19.6–45.3)
MCH RBC QN AUTO: 30.3 PG (ref 26.6–33)
MCHC RBC AUTO-ENTMCNC: 33.2 G/DL (ref 31.5–35.7)
MCV RBC AUTO: 91.5 FL (ref 79–97)
MONOCYTES # BLD AUTO: 0.48 10*3/MM3 (ref 0.1–0.9)
MONOCYTES NFR BLD AUTO: 7.6 % (ref 5–12)
NEUTROPHILS # BLD AUTO: 2.92 10*3/MM3 (ref 1.7–7)
NEUTROPHILS NFR BLD AUTO: 46 % (ref 42.7–76)
NITRITE UR QL STRIP: NEGATIVE
NRBC BLD AUTO-RTO: 0 /100 WBC (ref 0–0.2)
PH UR STRIP: 7 [PH] (ref 5–8)
PLATELET # BLD AUTO: 376 10*3/MM3 (ref 140–450)
POTASSIUM SERPL-SCNC: 4.2 MMOL/L (ref 3.5–5.2)
PROT SERPL-MCNC: 7 G/DL (ref 6–8.5)
PROT UR QL STRIP: NEGATIVE
RBC # BLD AUTO: 4.58 10*6/MM3 (ref 3.77–5.28)
RBC #/AREA URNS HPF: NORMAL /HPF
SODIUM SERPL-SCNC: 140 MMOL/L (ref 136–145)
SP GR UR STRIP: 1.01 (ref 1–1.03)
TRIGL SERPL-MCNC: 106 MG/DL (ref 0–150)
TSH SERPL DL<=0.005 MIU/L-ACNC: 1.2 UIU/ML (ref 0.27–4.2)
UROBILINOGEN UR STRIP-MCNC: NORMAL MG/DL
VLDLC SERPL CALC-MCNC: 18 MG/DL (ref 5–40)
WBC # BLD AUTO: 6.34 10*3/MM3 (ref 3.4–10.8)
WBC #/AREA URNS HPF: NORMAL /HPF

## 2023-02-26 RX ORDER — LEVOTHYROXINE SODIUM 0.1 MG/1
TABLET ORAL
Qty: 90 TABLET | Refills: 0 | Status: SHIPPED | OUTPATIENT
Start: 2023-02-26

## 2023-02-28 ENCOUNTER — TELEPHONE (OUTPATIENT)
Dept: INTERNAL MEDICINE | Facility: CLINIC | Age: 63
End: 2023-02-28
Payer: COMMERCIAL

## 2023-02-28 DIAGNOSIS — I10 BENIGN ESSENTIAL HTN: ICD-10-CM

## 2023-02-28 RX ORDER — BENAZEPRIL HYDROCHLORIDE 20 MG/1
TABLET ORAL
Qty: 90 TABLET | Refills: 0 | Status: SHIPPED | OUTPATIENT
Start: 2023-02-28

## 2023-02-28 NOTE — TELEPHONE ENCOUNTER
Caller: Becca Reilly    Relationship to patient: Self    Best call back number: [phone]    Chief complaint: MEDICATION REFILL VISIT    Type of visit: MEDICATION REFILL VISIT    Requested date: UNSURE    If rescheduling, when is the original appointment: N/A    Additional notes:PATIENT IS NOT SURE IF SHE NEEDS TO SCHEDULE THIS APPOINTMENT. PLEASE CALL TO SCHEDULE IF NEEDED.

## 2023-02-28 NOTE — TELEPHONE ENCOUNTER
Rx Refill Note  Requested Prescriptions     Pending Prescriptions Disp Refills   • benazepril (LOTENSIN) 20 MG tablet [Pharmacy Med Name: Benazepril HCl Oral Tablet 20 MG] 90 tablet 0     Sig: TAKE 1 TABLET BY MOUTH ONE TIME DAILY      Last office visit with prescribing clinician: 11/29/2022   Last telemedicine visit with prescribing clinician: Visit date not found   Next office visit with prescribing clinician: Visit date not found                         Would you like a call back once the refill request has been completed: [] Yes [] No    If the office needs to give you a call back, can they leave a voicemail: [] Yes [] No    Gricel Sage MA  02/28/23, 09:43 EST

## 2023-06-01 ENCOUNTER — OFFICE VISIT (OUTPATIENT)
Dept: INTERNAL MEDICINE | Facility: CLINIC | Age: 63
End: 2023-06-01

## 2023-06-01 VITALS
BODY MASS INDEX: 26.93 KG/M2 | SYSTOLIC BLOOD PRESSURE: 102 MMHG | RESPIRATION RATE: 16 BRPM | HEART RATE: 93 BPM | WEIGHT: 152 LBS | HEIGHT: 63 IN | OXYGEN SATURATION: 99 % | TEMPERATURE: 95.9 F | DIASTOLIC BLOOD PRESSURE: 62 MMHG

## 2023-06-01 DIAGNOSIS — E78.2 MODERATE MIXED HYPERLIPIDEMIA NOT REQUIRING STATIN THERAPY: Chronic | ICD-10-CM

## 2023-06-01 DIAGNOSIS — R73.03 PREDIABETES: ICD-10-CM

## 2023-06-01 DIAGNOSIS — E03.8 HYPOTHYROIDISM DUE TO HASHIMOTO'S THYROIDITIS: ICD-10-CM

## 2023-06-01 DIAGNOSIS — E06.3 HYPOTHYROIDISM DUE TO HASHIMOTO'S THYROIDITIS: ICD-10-CM

## 2023-06-01 DIAGNOSIS — I10 BENIGN ESSENTIAL HTN: Primary | ICD-10-CM

## 2023-06-01 RX ORDER — OMEPRAZOLE 20 MG/1
CAPSULE, DELAYED RELEASE ORAL
COMMUNITY
Start: 2023-04-07

## 2023-06-01 NOTE — PROGRESS NOTES
"Chief Complaint  Follow-up, Hypertension, and Hypothyroidism    Subjective          Becca Reilly presents to [facility] for follow up and med refills. Pt taking all medications daily as prescribed with good reported compliance. No issues or side effects with meds.   Pt seeing weight  through [place] and is currently on phentermine and topamax to assist with weight loss, doing well on this, no side effects, has been successful with weight loss of 30 lbs since starting program in 04/22.   BP on low end today but home values have typically been in 120-130s, does occasionally take 1/2 tab if am BP is low.       Objective   Vital Signs:     /62   Pulse 93   Temp 95.9 °F (35.5 °C)   Resp 16   Ht 160 cm (63\")   Wt 68.9 kg (152 lb)   SpO2 99%   BMI 26.93 kg/m²     Physical Exam  Vitals and nursing note reviewed.   Constitutional:       General: She is not in acute distress.     Appearance: Normal appearance.   Cardiovascular:      Rate and Rhythm: Normal rate and regular rhythm.      Pulses: Normal pulses.      Heart sounds: Normal heart sounds. No murmur heard.  Pulmonary:      Effort: Pulmonary effort is normal. No respiratory distress.      Breath sounds: Normal breath sounds.   Abdominal:      General: Abdomen is flat. Bowel sounds are normal.      Palpations: Abdomen is soft.      Tenderness: There is no abdominal tenderness.   Musculoskeletal:      Right lower leg: No edema.      Left lower leg: No edema.   Neurological:      Mental Status: She is alert and oriented to person, place, and time. Mental status is at baseline.   Psychiatric:         Mood and Affect: Mood normal.         Behavior: Behavior normal.          Result Review :   The following data was reviewed by: Suha Gr MD on 06/01/2023:  CMP        6/28/2022    08:50 11/29/2022    10:18   CMP   Glucose 98   86     BUN 14   12     Creatinine 0.80   0.69     Sodium " 138   140     Potassium 4.5   4.2     Chloride 102   103     Calcium 9.8   9.8     Total Protein 6.8   7.0     Albumin 4.6   4.20     Globulin 2.2   2.8     Total Bilirubin 0.4   0.3     Alkaline Phosphatase 75   74     AST (SGOT) 15   18     ALT (SGPT) 12   13     BUN/Creatinine Ratio 18   17.4       CBC w/diff        11/29/2022    10:18   CBC w/Diff   WBC 6.34     RBC 4.58     Hemoglobin 13.9     Hematocrit 41.9     MCV 91.5     MCH 30.3     MCHC 33.2     RDW 13.1     Platelets 376     Neutrophil Rel % 46.0     Lymphocyte Rel % 41.0     Monocyte Rel % 7.6     Eosinophil Rel % 4.4     Basophil Rel % 0.8       Lipid Panel        6/28/2022    08:50 11/29/2022    10:18   Lipid Panel   Total Cholesterol 257   270     Triglycerides 80   106     HDL Cholesterol 102   100     VLDL Cholesterol 13   18     LDL Cholesterol  142   152       TSH        6/28/2022    08:50 11/29/2022    10:18   TSH   TSH 1.700   1.200       Most Recent A1C        11/29/2022    10:18   HGBA1C Most Recent   Hemoglobin A1C 6.00       A1C Last 3 Results        6/28/2022    08:50 11/29/2022    10:18   HGBA1C Last 3 Results   Hemoglobin A1C 5.9   6.00            Assessment and Plan      Diagnoses and all orders for this visit:    1. Benign essential HTN (Primary)  Assessment & Plan:  CONTROLLED  - BP in low range in office today, home values reviewed and does tend to be normal at home and does check routinely.   - cont ACEi at full dose at baseline, can titrate dose to 10 mg as needed if BP low  - Cont checking BP at home intermittently, call if values trend outside of goal range  - was on CCB with Amlodipine but has been stopped with ongoing weight loss    Orders:  -     Comprehensive Metabolic Panel    2. Moderate mixed hyperlipidemia not requiring statin therapy  Assessment & Plan:  STABLE  - FLP today for ongoing monitoring, will discuss statin initiation as indicated based on FLP and ASCVD score  - cont with good diet and lifestyle changes  including increased exercise, low chol and low fat diet, and increased fiber    Orders:  -     Comprehensive Metabolic Panel  -     Lipid Panel    3. Hypothyroidism due to Hashimoto's thyroiditis  Assessment & Plan:  CONTROLLED  - TSH today for ongoing monitoring  - cont on current dose of LTX for now, will adjust dose as indicated based on labs    Orders:  -     TSH    4. Prediabetes  -     Comprehensive Metabolic Panel  -     Hemoglobin A1c        Follow Up   Return in about 6 months (around 12/1/2023) for Next scheduled follow up.    Patient was given instructions and counseling regarding her condition or for health maintenance advice. Please see specific information pulled into the AVS if appropriate.     Vashti Gr MD  [facility] [place] [facility]  Phone: [phone]  Fax: [phone]

## 2023-06-01 NOTE — ASSESSMENT & PLAN NOTE
CONTROLLED  - BP in low range in office today, home values reviewed and does tend to be normal at home and does check routinely.   - cont ACEi at full dose at baseline, can titrate dose to 10 mg as needed if BP low  - Cont checking BP at home intermittently, call if values trend outside of goal range  - was on CCB with Amlodipine but has been stopped with ongoing weight loss

## 2023-06-02 LAB
ALBUMIN SERPL-MCNC: 4.3 G/DL (ref 3.5–5.2)
ALBUMIN/GLOB SERPL: 1.9 G/DL
ALP SERPL-CCNC: 66 U/L (ref 39–117)
ALT SERPL-CCNC: 15 U/L (ref 1–33)
AST SERPL-CCNC: 12 U/L (ref 1–32)
BILIRUB SERPL-MCNC: 0.5 MG/DL (ref 0–1.2)
BUN SERPL-MCNC: 14 MG/DL (ref 8–23)
BUN/CREAT SERPL: 15.2 (ref 7–25)
CALCIUM SERPL-MCNC: 10.2 MG/DL (ref 8.6–10.5)
CHLORIDE SERPL-SCNC: 104 MMOL/L (ref 98–107)
CHOLEST SERPL-MCNC: 268 MG/DL (ref 0–200)
CO2 SERPL-SCNC: 27.8 MMOL/L (ref 22–29)
CREAT SERPL-MCNC: 0.92 MG/DL (ref 0.57–1)
EGFRCR SERPLBLD CKD-EPI 2021: 70.1 ML/MIN/1.73
GLOBULIN SER CALC-MCNC: 2.3 GM/DL
GLUCOSE SERPL-MCNC: 100 MG/DL (ref 65–99)
HBA1C MFR BLD: 5.7 % (ref 4.8–5.6)
HDLC SERPL-MCNC: 95 MG/DL (ref 40–60)
LDLC SERPL CALC-MCNC: 157 MG/DL (ref 0–100)
POTASSIUM SERPL-SCNC: 4 MMOL/L (ref 3.5–5.2)
PROT SERPL-MCNC: 6.6 G/DL (ref 6–8.5)
SODIUM SERPL-SCNC: 139 MMOL/L (ref 136–145)
TRIGL SERPL-MCNC: 98 MG/DL (ref 0–150)
TSH SERPL DL<=0.005 MIU/L-ACNC: 0.64 UIU/ML (ref 0.27–4.2)
VLDLC SERPL CALC-MCNC: 16 MG/DL (ref 5–40)

## 2023-06-09 DIAGNOSIS — I10 BENIGN ESSENTIAL HTN: ICD-10-CM

## 2023-06-09 RX ORDER — BENAZEPRIL HYDROCHLORIDE 20 MG/1
TABLET ORAL
Qty: 90 TABLET | Refills: 1 | Status: SHIPPED | OUTPATIENT
Start: 2023-06-09

## 2023-06-09 NOTE — TELEPHONE ENCOUNTER
Rx Refill Note  Requested Prescriptions     Pending Prescriptions Disp Refills    benazepril (LOTENSIN) 20 MG tablet [Pharmacy Med Name: Benazepril HCl Oral Tablet 20 MG] 90 tablet 0     Sig: TAKE 1 TABLET BY MOUTH ONE TIME DAILY      Last office visit with prescribing clinician: 6/1/2023   Last telemedicine visit with prescribing clinician: Visit date not found   Next office visit with prescribing clinician: 11/30/2023                         Would you like a call back once the refill request has been completed: [] Yes [] No    If the office needs to give you a call back, can they leave a voicemail: [] Yes [] No    Gricel Sage MA  06/09/23, 12:35 EDT

## 2023-08-13 RX ORDER — LEVOTHYROXINE SODIUM 0.1 MG/1
TABLET ORAL
Qty: 90 TABLET | Refills: 1 | Status: SHIPPED | OUTPATIENT
Start: 2023-08-13

## 2023-11-16 ENCOUNTER — PATIENT MESSAGE (OUTPATIENT)
Dept: INTERNAL MEDICINE | Facility: CLINIC | Age: 63
End: 2023-11-16

## 2023-11-17 NOTE — TELEPHONE ENCOUNTER
From: Becca Reilly  To: Suha Gr  Sent: 11/16/2023 11:28 AM EST  Subject: My visit    Hey Dr. Gr I am so sorry that I wasn’t able to stay for my physical appointment. I just simply could not wait an hour. I had another commitment later. They told me there was no other availability before you left.  I was going to talk to you about where you are going and would appreciate it if you could give me some information on that. Also, I had a colonoscopy yesterday and I cannot see the results until you read it.  Thank you for your care. I really like you as my doctor and sad to see you leave. Hopefully we can work something out in the future.  Becca Reilly

## 2024-04-02 NOTE — TELEPHONE ENCOUNTER
----- Message from Layla Medina sent at 5/4/2018  4:24 PM EDT -----  Regarding: PATIENT CALLED BACK  MARILYN PT    Patient returned out call. I read her the comments that were in the chart per dr love - re: her labs    All of the add on labs for autoimmunity and muscle disease were negative. Please try cymbalta and let me know how that goes    Thanks!  layla   No acute fractures/dislocations